# Patient Record
Sex: FEMALE | Race: WHITE | NOT HISPANIC OR LATINO | Employment: FULL TIME
[De-identification: names, ages, dates, MRNs, and addresses within clinical notes are randomized per-mention and may not be internally consistent; named-entity substitution may affect disease eponyms.]

---

## 2017-06-15 ENCOUNTER — RECORDS - HEALTHEAST (OUTPATIENT)
Dept: ADMINISTRATIVE | Facility: OTHER | Age: 22
End: 2017-06-15

## 2018-09-18 ENCOUNTER — AMBULATORY - HEALTHEAST (OUTPATIENT)
Dept: FAMILY MEDICINE | Facility: CLINIC | Age: 23
End: 2018-09-18

## 2018-09-18 ENCOUNTER — COMMUNICATION - HEALTHEAST (OUTPATIENT)
Dept: TELEHEALTH | Facility: CLINIC | Age: 23
End: 2018-09-18

## 2018-09-18 ENCOUNTER — OFFICE VISIT - HEALTHEAST (OUTPATIENT)
Dept: FAMILY MEDICINE | Facility: CLINIC | Age: 23
End: 2018-09-18

## 2018-09-18 DIAGNOSIS — F41.1 GENERALIZED ANXIETY DISORDER: ICD-10-CM

## 2018-09-18 DIAGNOSIS — Z76.89 ESTABLISHING CARE WITH NEW DOCTOR, ENCOUNTER FOR: ICD-10-CM

## 2018-09-18 ASSESSMENT — MIFFLIN-ST. JEOR: SCORE: 1605.9

## 2018-09-19 ENCOUNTER — AMBULATORY - HEALTHEAST (OUTPATIENT)
Dept: FAMILY MEDICINE | Facility: CLINIC | Age: 23
End: 2018-09-19

## 2018-10-17 ENCOUNTER — OFFICE VISIT - HEALTHEAST (OUTPATIENT)
Dept: FAMILY MEDICINE | Facility: CLINIC | Age: 23
End: 2018-10-17

## 2018-10-17 DIAGNOSIS — Z00.00 ANNUAL PHYSICAL EXAM: ICD-10-CM

## 2018-10-17 DIAGNOSIS — F41.0 PANIC DISORDER WITHOUT AGORAPHOBIA: ICD-10-CM

## 2018-10-17 DIAGNOSIS — F41.1 GENERALIZED ANXIETY DISORDER: ICD-10-CM

## 2018-10-17 LAB
ANION GAP SERPL CALCULATED.3IONS-SCNC: 10 MMOL/L (ref 5–18)
BUN SERPL-MCNC: 10 MG/DL (ref 8–22)
CALCIUM SERPL-MCNC: 9.9 MG/DL (ref 8.5–10.5)
CHLORIDE BLD-SCNC: 103 MMOL/L (ref 98–107)
CHOLEST SERPL-MCNC: 224 MG/DL
CO2 SERPL-SCNC: 26 MMOL/L (ref 22–31)
CREAT SERPL-MCNC: 0.76 MG/DL (ref 0.6–1.1)
FASTING STATUS PATIENT QL REPORTED: NO
GFR SERPL CREATININE-BSD FRML MDRD: >60 ML/MIN/1.73M2
GLUCOSE BLD-MCNC: 94 MG/DL (ref 70–125)
HDLC SERPL-MCNC: 46 MG/DL
HGB BLD-MCNC: 12.8 G/DL (ref 12–16)
LDLC SERPL CALC-MCNC: 124 MG/DL
POTASSIUM BLD-SCNC: 4.1 MMOL/L (ref 3.5–5)
SODIUM SERPL-SCNC: 139 MMOL/L (ref 136–145)
TRIGL SERPL-MCNC: 271 MG/DL
TSH SERPL DL<=0.005 MIU/L-ACNC: 3.05 UIU/ML (ref 0.3–5)

## 2018-10-17 ASSESSMENT — MIFFLIN-ST. JEOR: SCORE: 1605.9

## 2018-10-18 LAB
25(OH)D3 SERPL-MCNC: 17.2 NG/ML (ref 30–80)
25(OH)D3 SERPL-MCNC: 17.2 NG/ML (ref 30–80)

## 2018-10-19 ENCOUNTER — AMBULATORY - HEALTHEAST (OUTPATIENT)
Dept: FAMILY MEDICINE | Facility: CLINIC | Age: 23
End: 2018-10-19

## 2018-10-19 DIAGNOSIS — E55.9 VITAMIN D DEFICIENCY: ICD-10-CM

## 2018-11-20 ENCOUNTER — COMMUNICATION - HEALTHEAST (OUTPATIENT)
Dept: FAMILY MEDICINE | Facility: CLINIC | Age: 23
End: 2018-11-20

## 2018-11-20 DIAGNOSIS — F41.1 GENERALIZED ANXIETY DISORDER: ICD-10-CM

## 2019-01-17 ENCOUNTER — COMMUNICATION - HEALTHEAST (OUTPATIENT)
Dept: FAMILY MEDICINE | Facility: CLINIC | Age: 24
End: 2019-01-17

## 2019-01-17 ENCOUNTER — OFFICE VISIT - HEALTHEAST (OUTPATIENT)
Dept: FAMILY MEDICINE | Facility: CLINIC | Age: 24
End: 2019-01-17

## 2019-01-17 DIAGNOSIS — F41.1 GENERALIZED ANXIETY DISORDER: ICD-10-CM

## 2019-01-17 DIAGNOSIS — F41.0 PANIC DISORDER WITHOUT AGORAPHOBIA: ICD-10-CM

## 2019-01-17 ASSESSMENT — MIFFLIN-ST. JEOR: SCORE: 1634.54

## 2019-04-25 ENCOUNTER — OFFICE VISIT - HEALTHEAST (OUTPATIENT)
Dept: FAMILY MEDICINE | Facility: CLINIC | Age: 24
End: 2019-04-25

## 2019-04-25 DIAGNOSIS — F41.1 GENERALIZED ANXIETY DISORDER: ICD-10-CM

## 2019-04-25 DIAGNOSIS — F40.01 PANIC DISORDER WITH AGORAPHOBIA: ICD-10-CM

## 2019-04-25 ASSESSMENT — MIFFLIN-ST. JEOR: SCORE: 1671.39

## 2019-05-21 ENCOUNTER — OFFICE VISIT - HEALTHEAST (OUTPATIENT)
Dept: FAMILY MEDICINE | Facility: CLINIC | Age: 24
End: 2019-05-21

## 2019-05-21 DIAGNOSIS — F41.0 PANIC DISORDER WITHOUT AGORAPHOBIA: ICD-10-CM

## 2019-05-21 DIAGNOSIS — F41.1 GENERALIZED ANXIETY DISORDER: ICD-10-CM

## 2019-05-21 DIAGNOSIS — M25.572 ACUTE LEFT ANKLE PAIN: ICD-10-CM

## 2019-05-21 ASSESSMENT — MIFFLIN-ST. JEOR: SCORE: 1643.61

## 2019-06-18 ENCOUNTER — OFFICE VISIT - HEALTHEAST (OUTPATIENT)
Dept: FAMILY MEDICINE | Facility: CLINIC | Age: 24
End: 2019-06-18

## 2019-06-18 DIAGNOSIS — F40.01 PANIC DISORDER WITH AGORAPHOBIA: ICD-10-CM

## 2019-06-18 DIAGNOSIS — F41.1 GENERALIZED ANXIETY DISORDER: ICD-10-CM

## 2019-06-18 DIAGNOSIS — R21 RASH: ICD-10-CM

## 2019-06-18 ASSESSMENT — MIFFLIN-ST. JEOR: SCORE: 1642.19

## 2019-06-20 ENCOUNTER — AMBULATORY - HEALTHEAST (OUTPATIENT)
Dept: FAMILY MEDICINE | Facility: CLINIC | Age: 24
End: 2019-06-20

## 2019-07-16 ENCOUNTER — OFFICE VISIT - HEALTHEAST (OUTPATIENT)
Dept: FAMILY MEDICINE | Facility: CLINIC | Age: 24
End: 2019-07-16

## 2019-07-16 DIAGNOSIS — F40.01 PANIC DISORDER WITH AGORAPHOBIA: ICD-10-CM

## 2019-07-16 DIAGNOSIS — F41.1 GENERALIZED ANXIETY DISORDER: ICD-10-CM

## 2019-07-16 ASSESSMENT — MIFFLIN-ST. JEOR: SCORE: 1637.66

## 2020-01-21 ENCOUNTER — OFFICE VISIT - HEALTHEAST (OUTPATIENT)
Dept: FAMILY MEDICINE | Facility: CLINIC | Age: 25
End: 2020-01-21

## 2020-01-21 DIAGNOSIS — F41.1 GENERALIZED ANXIETY DISORDER: ICD-10-CM

## 2020-01-21 DIAGNOSIS — F40.01 PANIC DISORDER WITH AGORAPHOBIA: ICD-10-CM

## 2020-01-21 ASSESSMENT — ANXIETY QUESTIONNAIRES
5. BEING SO RESTLESS THAT IT IS HARD TO SIT STILL: NOT AT ALL
3. WORRYING TOO MUCH ABOUT DIFFERENT THINGS: NOT AT ALL
4. TROUBLE RELAXING: NOT AT ALL
2. NOT BEING ABLE TO STOP OR CONTROL WORRYING: NOT AT ALL
IF YOU CHECKED OFF ANY PROBLEMS ON THIS QUESTIONNAIRE, HOW DIFFICULT HAVE THESE PROBLEMS MADE IT FOR YOU TO DO YOUR WORK, TAKE CARE OF THINGS AT HOME, OR GET ALONG WITH OTHER PEOPLE: SOMEWHAT DIFFICULT
6. BECOMING EASILY ANNOYED OR IRRITABLE: NOT AT ALL
1. FEELING NERVOUS, ANXIOUS, OR ON EDGE: SEVERAL DAYS
7. FEELING AFRAID AS IF SOMETHING AWFUL MIGHT HAPPEN: NOT AT ALL
GAD7 TOTAL SCORE: 1

## 2020-01-21 ASSESSMENT — MIFFLIN-ST. JEOR: SCORE: 1752.76

## 2020-01-21 ASSESSMENT — PATIENT HEALTH QUESTIONNAIRE - PHQ9: SUM OF ALL RESPONSES TO PHQ QUESTIONS 1-9: 1

## 2020-01-28 ENCOUNTER — OFFICE VISIT - HEALTHEAST (OUTPATIENT)
Dept: FAMILY MEDICINE | Facility: CLINIC | Age: 25
End: 2020-01-28

## 2020-01-28 DIAGNOSIS — Z00.00 ANNUAL PHYSICAL EXAM: ICD-10-CM

## 2020-01-28 DIAGNOSIS — Z13.79 GENETIC TESTING OF FEMALE: ICD-10-CM

## 2020-01-28 DIAGNOSIS — Z80.3 FAMILY HISTORY OF MALIGNANT NEOPLASM OF BREAST: ICD-10-CM

## 2020-01-28 LAB
ANION GAP SERPL CALCULATED.3IONS-SCNC: 14 MMOL/L (ref 5–18)
BUN SERPL-MCNC: 9 MG/DL (ref 8–22)
CALCIUM SERPL-MCNC: 9.5 MG/DL (ref 8.5–10.5)
CHLORIDE BLD-SCNC: 103 MMOL/L (ref 98–107)
CHOLEST SERPL-MCNC: 243 MG/DL
CLUE CELLS: NORMAL
CO2 SERPL-SCNC: 22 MMOL/L (ref 22–31)
CREAT SERPL-MCNC: 0.75 MG/DL (ref 0.6–1.1)
FASTING STATUS PATIENT QL REPORTED: YES
GFR SERPL CREATININE-BSD FRML MDRD: >60 ML/MIN/1.73M2
GLUCOSE BLD-MCNC: 101 MG/DL (ref 70–125)
HDLC SERPL-MCNC: 48 MG/DL
HGB BLD-MCNC: 13.6 G/DL (ref 12–16)
HIV 1+2 AB+HIV1 P24 AG SERPL QL IA: NEGATIVE
LDLC SERPL CALC-MCNC: 145 MG/DL
POTASSIUM BLD-SCNC: 4.3 MMOL/L (ref 3.5–5)
SODIUM SERPL-SCNC: 139 MMOL/L (ref 136–145)
TRICHOMONAS, WET PREP: NORMAL
TRIGL SERPL-MCNC: 250 MG/DL
TSH SERPL DL<=0.005 MIU/L-ACNC: 3.01 UIU/ML (ref 0.3–5)
YEAST, WET PREP: NORMAL

## 2020-01-28 ASSESSMENT — MIFFLIN-ST. JEOR: SCORE: 1758.43

## 2020-01-29 LAB
25(OH)D3 SERPL-MCNC: 30.7 NG/ML (ref 30–80)
25(OH)D3 SERPL-MCNC: 30.7 NG/ML (ref 30–80)
BKR LAB AP ABNORMAL BLEEDING: NO
BKR LAB AP BIRTH CONTROL/HORMONES: NORMAL
BKR LAB AP CERVICAL APPEARANCE: NORMAL
BKR LAB AP GYN ADEQUACY: NORMAL
BKR LAB AP GYN INTERPRETATION: NORMAL
BKR LAB AP HPV REFLEX: NORMAL
BKR LAB AP LMP: NORMAL
BKR LAB AP PATIENT STATUS: NORMAL
BKR LAB AP PREVIOUS ABNORMAL: NO
BKR LAB AP PREVIOUS NORMAL: NORMAL
HIGH RISK?: NO
PATH REPORT.COMMENTS IMP SPEC: NORMAL
RESULT FLAG (HE HISTORICAL CONVERSION): NORMAL

## 2020-02-27 ENCOUNTER — COMMUNICATION - HEALTHEAST (OUTPATIENT)
Dept: ADMINISTRATIVE | Facility: HOSPITAL | Age: 25
End: 2020-02-27

## 2020-02-28 ENCOUNTER — OFFICE VISIT - HEALTHEAST (OUTPATIENT)
Dept: ONCOLOGY | Facility: CLINIC | Age: 25
End: 2020-02-28

## 2020-02-28 DIAGNOSIS — Z80.3 FAMILY HISTORY OF MALIGNANT NEOPLASM OF BREAST: ICD-10-CM

## 2020-02-28 DIAGNOSIS — Z71.83 ENCOUNTER FOR NONPROCREATIVE GENETIC COUNSELING: ICD-10-CM

## 2020-08-05 ENCOUNTER — OFFICE VISIT - HEALTHEAST (OUTPATIENT)
Dept: FAMILY MEDICINE | Facility: CLINIC | Age: 25
End: 2020-08-05

## 2020-08-05 DIAGNOSIS — L30.9 ECZEMA, UNSPECIFIED TYPE: ICD-10-CM

## 2020-08-05 DIAGNOSIS — F40.01 PANIC DISORDER WITH AGORAPHOBIA: ICD-10-CM

## 2020-08-05 DIAGNOSIS — H91.93 DECREASED HEARING OF BOTH EARS: ICD-10-CM

## 2020-08-05 DIAGNOSIS — F41.1 GENERALIZED ANXIETY DISORDER: ICD-10-CM

## 2020-08-05 RX ORDER — FLUOCINONIDE TOPICAL SOLUTION USP, 0.05% 0.5 MG/ML
SOLUTION TOPICAL 2 TIMES DAILY
Qty: 60 ML | Refills: 0 | Status: SHIPPED | OUTPATIENT
Start: 2020-08-05

## 2020-08-05 ASSESSMENT — ANXIETY QUESTIONNAIRES
IF YOU CHECKED OFF ANY PROBLEMS ON THIS QUESTIONNAIRE, HOW DIFFICULT HAVE THESE PROBLEMS MADE IT FOR YOU TO DO YOUR WORK, TAKE CARE OF THINGS AT HOME, OR GET ALONG WITH OTHER PEOPLE: SOMEWHAT DIFFICULT
5. BEING SO RESTLESS THAT IT IS HARD TO SIT STILL: NOT AT ALL
2. NOT BEING ABLE TO STOP OR CONTROL WORRYING: NOT AT ALL
7. FEELING AFRAID AS IF SOMETHING AWFUL MIGHT HAPPEN: NOT AT ALL
3. WORRYING TOO MUCH ABOUT DIFFERENT THINGS: SEVERAL DAYS
1. FEELING NERVOUS, ANXIOUS, OR ON EDGE: MORE THAN HALF THE DAYS
GAD7 TOTAL SCORE: 4
4. TROUBLE RELAXING: NOT AT ALL
6. BECOMING EASILY ANNOYED OR IRRITABLE: SEVERAL DAYS

## 2020-08-05 ASSESSMENT — MIFFLIN-ST. JEOR: SCORE: 1764.66

## 2020-08-05 ASSESSMENT — PATIENT HEALTH QUESTIONNAIRE - PHQ9: SUM OF ALL RESPONSES TO PHQ QUESTIONS 1-9: 7

## 2021-03-12 ENCOUNTER — OFFICE VISIT - HEALTHEAST (OUTPATIENT)
Dept: FAMILY MEDICINE | Facility: CLINIC | Age: 26
End: 2021-03-12

## 2021-03-12 DIAGNOSIS — F41.1 GENERALIZED ANXIETY DISORDER: ICD-10-CM

## 2021-03-12 DIAGNOSIS — F40.01 PANIC DISORDER WITH AGORAPHOBIA: ICD-10-CM

## 2021-03-12 DIAGNOSIS — F33.0 MILD EPISODE OF RECURRENT MAJOR DEPRESSIVE DISORDER (H): ICD-10-CM

## 2021-03-12 RX ORDER — PAROXETINE 40 MG/1
40 TABLET, FILM COATED ORAL DAILY
Qty: 90 TABLET | Refills: 1 | Status: SHIPPED | OUTPATIENT
Start: 2021-03-12 | End: 2021-07-27

## 2021-03-12 ASSESSMENT — ANXIETY QUESTIONNAIRES
4. TROUBLE RELAXING: NOT AT ALL
IF YOU CHECKED OFF ANY PROBLEMS ON THIS QUESTIONNAIRE, HOW DIFFICULT HAVE THESE PROBLEMS MADE IT FOR YOU TO DO YOUR WORK, TAKE CARE OF THINGS AT HOME, OR GET ALONG WITH OTHER PEOPLE: SOMEWHAT DIFFICULT
7. FEELING AFRAID AS IF SOMETHING AWFUL MIGHT HAPPEN: NOT AT ALL
GAD7 TOTAL SCORE: 2
5. BEING SO RESTLESS THAT IT IS HARD TO SIT STILL: NOT AT ALL
6. BECOMING EASILY ANNOYED OR IRRITABLE: NOT AT ALL
3. WORRYING TOO MUCH ABOUT DIFFERENT THINGS: NOT AT ALL
2. NOT BEING ABLE TO STOP OR CONTROL WORRYING: NOT AT ALL
1. FEELING NERVOUS, ANXIOUS, OR ON EDGE: MORE THAN HALF THE DAYS

## 2021-03-12 ASSESSMENT — PATIENT HEALTH QUESTIONNAIRE - PHQ9: SUM OF ALL RESPONSES TO PHQ QUESTIONS 1-9: 2

## 2021-03-22 ENCOUNTER — COMMUNICATION - HEALTHEAST (OUTPATIENT)
Dept: FAMILY MEDICINE | Facility: CLINIC | Age: 26
End: 2021-03-22

## 2021-05-27 ENCOUNTER — RECORDS - HEALTHEAST (OUTPATIENT)
Dept: ADMINISTRATIVE | Facility: CLINIC | Age: 26
End: 2021-05-27

## 2021-05-27 ASSESSMENT — PATIENT HEALTH QUESTIONNAIRE - PHQ9
SUM OF ALL RESPONSES TO PHQ QUESTIONS 1-9: 2
SUM OF ALL RESPONSES TO PHQ QUESTIONS 1-9: 1
SUM OF ALL RESPONSES TO PHQ QUESTIONS 1-9: 7

## 2021-05-28 ASSESSMENT — ANXIETY QUESTIONNAIRES
GAD7 TOTAL SCORE: 4
GAD7 TOTAL SCORE: 1
GAD7 TOTAL SCORE: 2

## 2021-05-28 NOTE — PROGRESS NOTES
1. Generalized anxiety disorder  PARoxetine (PAXIL) 20 MG tablet    DISCONTINUED: sertraline (ZOLOFT) 100 MG tablet   2. Panic disorder with agoraphobia  PARoxetine (PAXIL) 20 MG tablet     PHQ-9: 1  CHELA-7: 7    ASSESSMENT/PLAN:     The following are part of a depression follow up plan for the patient:  coping support assessment and emotional support assessment    1. Generalized anxiety disorder    - PARoxetine (PAXIL) 20 MG tablet; Take 1 tablet (20 mg total) by mouth daily.  Dispense: 30 tablet; Refill: 0    2. Panic disorder with agoraphobia    - PARoxetine (PAXIL) 20 MG tablet; Take 1 tablet (20 mg total) by mouth daily.  Dispense: 30 tablet; Refill: 0      There are no Patient Instructions on file for this visit.  Medications Discontinued During This Encounter   Medication Reason     sertraline (ZOLOFT) 50 MG tablet Dose adjustment     sertraline (ZOLOFT) 100 MG tablet Side effects     Return in about 4 weeks (around 5/21/2019) for depression, anxiety.         Marbella Rodriguez NP          SUBJECTIVE:  Darby Lopez is a 23 y.o. female presents for anxiety with acute panic state and Agoraphobia and depression.     Patient was last seen in clinic 3 months ago and at that time, her anxiety, Agoraphobia and depression were well controlled.  She was given Zoloft 50 mg and things are going well.  She states that over the last several weeks since the weather has been nicer, this has presented her more opportunities to go out and do other activities such as long boarding with her brother.  She states the other day, her and her brother were long boarding and they went deeper into the [place] that she normally has in the past and she had to stop.  She was fearful for how far away she was from her vehicle and had to turn back.  She has been staying up later at night as well, she continues to get 6 to 8 hours asleep at night.  She has also been experiencing abdominal bloating with diarrhea symptoms for the last 2  "months.  She states the stools can be anywhere from watery to soft like oatmeal.  She denies any black or bloody stools.  She has been managed in the past by psychiatry for her anxiety, depression and [place] phobia.  Previous medications include fluoxetine, escitalopram and citalopram.  He has no thoughts of self-harm or plans to suicide, no reports of self-mutilation.  Coping strategies include going to place where the lights are low and she can talk herself down, listening to music and pushing through the rest of her day.  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  Suspect that her abdominal bloating with diarrhea is related to the sertraline use, we did discuss other options of medication and she has opted to try the Paxil.  Goals for her anxiety, depression and [place] phobia \"I would like to be able to go out and enjoy more things, especially the summer\".  If I am not able to get her symptoms controlled on this medication, referral to psych will be the next recommendation due to her Agoraphobia, anxiety and depressive state. VSS.  Chief Complaint   Patient presents with     Follow-up     Med Discussion -          Patient Active Problem List   Diagnosis     Generalized anxiety disorder     Panic disorder without agoraphobia     Panic disorder with agoraphobia       Current Outpatient Medications   Medication Sig Dispense Refill     PARoxetine (PAXIL) 20 MG tablet Take 1 tablet (20 mg total) by mouth daily. 30 tablet 0     No current facility-administered medications for this visit.        Social History     Tobacco Use   Smoking Status Never Smoker   Smokeless Tobacco Never Used       REVIEW OF SYSTEMS: Excessive worries, restlessness, on edge, easily fatigues, irritability, disturbed sleep, decreased concentration, muscle tension, fear of health.      TOBACCO USE:  Social History     Tobacco Use   Smoking Status Never Smoker   Smokeless Tobacco Never Used     Social " History     Socioeconomic History     Marital status: Single     Spouse name: Not on file     Number of children: 0     Years of education: 14     Highest education level: Not on file   Occupational History     Occupation: veterinary tech     Employer: [employer]   Social Needs     Financial resource strain: Not on file     Food insecurity:     Worry: Not on file     Inability: Not on file     Transportation needs:     Medical: Not on file     Non-medical: Not on file   Tobacco Use     Smoking status: Never Smoker     Smokeless tobacco: Never Used   Substance and Sexual Activity     Alcohol use: No     Drug use: No     Sexual activity: Never   Lifestyle     Physical activity:     Days per week: Not on file     Minutes per session: Not on file     Stress: Not on file   Relationships     Social connections:     Talks on phone: Not on file     Gets together: Not on file     Attends Orthodox service: Not on file     Active member of club or organization: Not on file     Attends meetings of clubs or organizations: Not on file     Relationship status: Not on file     Intimate partner violence:     Fear of current or ex partner: Not on file     Emotionally abused: Not on file     Physically abused: Not on file     Forced sexual activity: Not on file   Other Topics Concern     Not on file   Social History Narrative    Lives with parents, was home schooled all her life up until age 19         OBJECTIVE:            Vitals:    04/25/19 1437   BP: 110/76   Pulse: 85   Resp: 14   Temp: 98.5  F (36.9  C)   SpO2: 99%     Weight: 199 lb 7 oz (90.5 kg)    Wt Readings from Last 3 Encounters:   04/25/19 199 lb 7 oz (90.5 kg)   01/17/19 191 lb 5 oz (86.8 kg)   10/17/18 185 lb (83.9 kg)     Body mass index is 32.19 kg/m .        Physical Exam:  GENERAL APPEARANCE: A&A, NAD, well hydrated, well nourished  CV: RRR, no M/G/R   LUNGS: CTAB, normal respiratory effort  ABDOMEN: S&NT, no masses, no organomegaly, BS present x4   SKIN:   Normal skin turgor, no lesions/rashes, warm and dry   PSYCHIATRIC;  Mood appropriate, memory intact, good eye contact, engaged in conversation

## 2021-05-29 NOTE — PROGRESS NOTES
1. Generalized anxiety disorder  PARoxetine (PAXIL) 10 MG tablet   2. Panic disorder without agoraphobia  PARoxetine (PAXIL) 10 MG tablet   3. Acute left ankle pain       PHQ-9: 1  CHELA-7: 10    ASSESSMENT/PLAN:     The following are part of a depression follow up plan for the patient:  coping support assessment and emotional support assessment  The following high BMI interventions were performed this visit: exercise promotion: stretching    1. Generalized anxiety disorder    - PARoxetine (PAXIL) 10 MG tablet; Take 3 tablets (30 mg total) by mouth daily.  Dispense: 90 tablet; Refill: 0    2. Panic disorder without agoraphobia    - PARoxetine (PAXIL) 10 MG tablet; Take 3 tablets (30 mg total) by mouth daily.  Dispense: 90 tablet; Refill: 0    3. Acute left ankle pain    -Patient instructed to rest, ice the affected area several times per day for 10 minutes at a time, may use compression wrap to the area if pain and swelling occur and elevated the affected area whenever patient is able. May take Tylenol 1000 mg four times per day or Ibuprofen 800 mg three times daily for pain and inflammation.         There are no Patient Instructions on file for this visit.  Medications Discontinued During This Encounter   Medication Reason     medroxyPROGESTERone injection 150 mg (DEPO-PROVERA)      PARoxetine (PAXIL) 20 MG tablet Dose adjustment     Return in about 1 month (around 6/18/2019) for depression, anxiety.        Marbella Rodriguez NP          SUBJECTIVE:  Darby Lopez is a 23 y.o. female who presents for depression follow-up today.  Patient was previously taking Zoloft was experiencing GI side effects from the medication that included bloating and diarrhea.  We did take her off the Zoloft and initiated her on Paxil 20 mg daily.  Current stressors include the recent diagnosis of her dog's cancer in her left ankle pain.  She continues to have episodes of fever and anxiety and some heart palpitations, she has had less  Form faxed with confirmation of receipt. Original placed up front.   intrusive thoughts of acute panic states when she knows she is going out in public with large crowds.  She continues to use music and distraction as a coping mechanism for her anxiety and acute panic state.  She has not tried long boarding again since she went with her brother the last time I saw her in clinic.  Last time she went long boarding, she was fearful about how far away she was getting from her car and immediately turned back on the Trail she was on with her brother.  She would like to go up a little bit on her Paxil medication, she feels that a dose increase would be most appropriate for her at this time being that she still has some fear and anxiety with heart palpitations when she is around large crowds.    Left ankle pain with swelling: Onset: 3 weeks.  She states the swelling has been constant in the posterior tibial region.  Aggravating factors include walking and towards the end of her workday she will notice increased swelling.  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  Relieving factors include elevating the extremity and laying flat.  She has not noticed any color changes or numbness or tingling, she denies any limited range of motion.  No history of blood clot formation.  Left ankle is measuring half an inch larger than the other ankle.  We did discuss limiting her sodium intake and increasing her water intake to see if we can get the swelling to go down a bit.  No x-ray imaging or ultrasound is necessary at this time being that she has no color changes or discomfort, no limited range of motion.  Courage her to ice the area and use a compression brace to see if this assists with the swelling as well.  Chief Complaint   Patient presents with     Follow-up     Med Check-      Ankle Pain     LT swollen x 3 weeks no known injurym no pain         Patient Active Problem List   Diagnosis     Generalized anxiety disorder     Panic disorder without agoraphobia      Panic disorder with agoraphobia       Current Outpatient Medications   Medication Sig Dispense Refill     PARoxetine (PAXIL) 10 MG tablet Take 3 tablets (30 mg total) by mouth daily. 90 tablet 0     No current facility-administered medications for this visit.        Social History     Tobacco Use   Smoking Status Never Smoker   Smokeless Tobacco Never Used       REVIEW OF SYSTEMS: Excessive worries, restlessness, on edge, easily fatigues, irritability, disturbed sleep, decreased concentration, muscle tension, fear of health.      TOBACCO USE:  Social History     Tobacco Use   Smoking Status Never Smoker   Smokeless Tobacco Never Used     Social History     Socioeconomic History     Marital status: Single     Spouse name: Not on file     Number of children: 0     Years of education: 14     Highest education level: Not on file   Occupational History     Occupation: veterinary tech     Employer: [employer]   Social Needs     Financial resource strain: Not on file     Food insecurity:     Worry: Not on file     Inability: Not on file     Transportation needs:     Medical: Not on file     Non-medical: Not on file   Tobacco Use     Smoking status: Never Smoker     Smokeless tobacco: Never Used   Substance and Sexual Activity     Alcohol use: No     Drug use: No     Sexual activity: Never   Lifestyle     Physical activity:     Days per week: Not on file     Minutes per session: Not on file     Stress: Not on file   Relationships     Social connections:     Talks on phone: Not on file     Gets together: Not on file     Attends Church service: Not on file     Active member of club or organization: Not on file     Attends meetings of clubs or organizations: Not on file     Relationship status: Not on file     Intimate partner violence:     Fear of current or ex partner: Not on file     Emotionally abused: Not on file     Physically abused: Not on file     Forced sexual activity: Not on file   Other Topics Concern      Not on file   Social History Narrative    Lives with parents, was home schooled all her life up until age 19         OBJECTIVE:            Vitals:    05/21/19 1535   BP: 110/70   Pulse: 88   Resp: 12   Temp: 98.8  F (37.1  C)   SpO2: 98%     Weight: 193 lb 5 oz (87.7 kg)    Wt Readings from Last 3 Encounters:   05/21/19 193 lb 5 oz (87.7 kg)   04/25/19 199 lb 7 oz (90.5 kg)   01/17/19 191 lb 5 oz (86.8 kg)     Body mass index is 31.2 kg/m .        Physical Exam:  GENERAL APPEARANCE: A&A, NAD, well hydrated, well nourished  CV: RRR, no M/G/R   LUNGS: CTAB, normal respiratory effort  ABDOMEN: S&NT, no masses, no organomegaly, BS present x4   EXTREMITY: Mild edema in the left posttibial region with 2+ pitting edema.  Ankle measures 8 and half inches, right ankle measures 8 inches.  Patient has full range of motion.  No discoloration, she does have full sensation.   SKIN:  Normal skin turgor, no lesions/rashes, warm and dry    PSYCHIATRIC;  Mood appropriate, memory intact, good eye contact, engaged in conversation

## 2021-05-29 NOTE — PROGRESS NOTES
1. Generalized anxiety disorder  PARoxetine (PAXIL) 40 MG tablet   2. Panic disorder with agoraphobia  PARoxetine (PAXIL) 40 MG tablet   3. Rash  fluocinolone (VANOS) 0.01 % cream     PHQ-9: 0  CHELA-7: 5    ASSESSMENT/PLAN:     The following are part of a depression follow up plan for the patient:  coping support assessment and emotional support assessment    1. Generalized anxiety disorder    - PARoxetine (PAXIL) 40 MG tablet; Take 1 tablet (40 mg total) by mouth daily.  Dispense: 30 tablet; Refill: 0    2. Panic disorder with agoraphobia    - PARoxetine (PAXIL) 40 MG tablet; Take 1 tablet (40 mg total) by mouth daily.  Dispense: 30 tablet; Refill: 0    3. Rash    -previously prescribed this by her dermatologist for her eczema  - fluocinolone (VANOS) 0.01 % cream; Apply topically 2 (two) times a day for 14 days.  Dispense: 60 g; Refill: 0      There are no Patient Instructions on file for this visit.  Medications Discontinued During This Encounter   Medication Reason     PARoxetine (PAXIL) 10 MG tablet Dose adjustment     Return in about 1 month (around 7/16/2019) for anxiety. Paxil dose increased to 40 mg daily          Marbella Rodriguez NP          SUBJECTIVE:  Darby Lopez is a 24 y.o. female present for anxiety, depression and agoraphobia follow up    Symptoms well controlled on current dose of medication, however, she would like to discuss an increase in her dosing due to ongoing issues with her agoraphobia. She has been out to eat with her parents to [employer], with friends to a bar/restauraunt for her recent birthday and with her grandma to [place] since she has been on the 30 mg dose. She continues to experience feelings like she is going to have a panic attack, and she has continued to leave social functions after feeling these episodes a few times. She was able to venture a little bit further from her house when she was looking for her cat one night after he got out of the house. We  did review the current guideline for treating anxiety with agoraphobia and studies do suggest doing up to 40 mg daily. Coping strategies: has not had to use them much but include chewing gum, lavender oil and listening to music with her headphones in. She admits to mild fatigue and a few episodes of constipation. Denies thoughts of self harm, suicide or self mutilation.     She is requesting a refill of her topical cream that she uses for her eczema. Last prescribed by her dermatologist. Rash: located on left wrist and posterior neck. Dry, scaly and itchy. No pain. Thinks it may have flared up from detergent her mom is using. No recent travel or any other new products.   Chief Complaint   Patient presents with     Follow-up     Med Check -          Patient Active Problem List   Diagnosis     Generalized anxiety disorder     Panic disorder without agoraphobia     Panic disorder with agoraphobia       Current Outpatient Medications   Medication Sig Dispense Refill     fluocinolone (VANOS) 0.01 % cream Apply topically 2 (two) times a day for 14 days. 60 g 0     PARoxetine (PAXIL) 40 MG tablet Take 1 tablet (40 mg total) by mouth daily. 30 tablet 0     No current facility-administered medications for this visit.        Social History     Tobacco Use   Smoking Status Never Smoker   Smokeless Tobacco Never Used       REVIEW OF SYSTEMS: Denies new soaps, lotions, makeup, foods, bedding, recent travel, sick contacts, fever or chills.      TOBACCO USE:  Social History     Tobacco Use   Smoking Status Never Smoker   Smokeless Tobacco Never Used     Social History     Socioeconomic History     Marital status: Single     Spouse name: Not on file     Number of children: 0     Years of education: 14     Highest education level: Not on file   Occupational History     Occupation: veterinary tech     Employer: [employer]   Social Needs     Financial resource strain: Not on file     Food insecurity:     Worry: Not on file      Inability: Not on file     Transportation needs:     Medical: Not on file     Non-medical: Not on file   Tobacco Use     Smoking status: Never Smoker     Smokeless tobacco: Never Used   Substance and Sexual Activity     Alcohol use: No     Drug use: No     Sexual activity: Never   Lifestyle     Physical activity:     Days per week: Not on file     Minutes per session: Not on file     Stress: Not on file   Relationships     Social connections:     Talks on phone: Not on file     Gets together: Not on file     Attends Evangelical service: Not on file     Active member of club or organization: Not on file     Attends meetings of clubs or organizations: Not on file     Relationship status: Not on file     Intimate partner violence:     Fear of current or ex partner: Not on file     Emotionally abused: Not on file     Physically abused: Not on file     Forced sexual activity: Not on file   Other Topics Concern     Not on file   Social History Narrative    Lives with parents, was home schooled all her life up until age 19         OBJECTIVE:            Vitals:    06/18/19 1630   BP: 110/70   Pulse: 91   Resp: 14   Temp: 98.7  F (37.1  C)   SpO2: 99%     Weight: 193 lb (87.5 kg)    Wt Readings from Last 3 Encounters:   06/18/19 193 lb (87.5 kg)   05/21/19 193 lb 5 oz (87.7 kg)   04/25/19 199 lb 7 oz (90.5 kg)     Body mass index is 31.15 kg/m .        Physical Exam:  GENERAL APPEARANCE: A&A, NAD, well hydrated, well nourished  CV: RRR, no M/G/R   LUNGS: CTAB, normal respiratory effort  ABDOMEN: S&NT, no masses, no organomegaly, BS present x4   SKIN:  Normal skin turgor, small rash on left posterior wrist and back of neck, rash is dry and scaly, no bleeding noted. Slightly raised, warm and dry    PSYCHIATRIC;  Mood appropriate, memory intact, good eye contact, engaged in conversation

## 2021-05-30 NOTE — PROGRESS NOTES
1. Generalized anxiety disorder  PARoxetine (PAXIL) 40 MG tablet   2. Panic disorder with agoraphobia  PARoxetine (PAXIL) 40 MG tablet         ASSESSMENT/PLAN:     The following high BMI interventions were performed this visit: weight monitoring    1. Generalized anxiety disorder    - PARoxetine (PAXIL) 40 MG tablet; Take 1 tablet (40 mg total) by mouth daily.  Dispense: 90 tablet; Refill: 1    2. Panic disorder with agoraphobia    - PARoxetine (PAXIL) 40 MG tablet; Take 1 tablet (40 mg total) by mouth daily.  Dispense: 90 tablet; Refill: 1      There are no Patient Instructions on file for this visit.  Medications Discontinued During This Encounter   Medication Reason     PARoxetine (PAXIL) 40 MG tablet Reorder     Return in about 6 months (around 1/16/2020) for depression, anxiety, annual physical.        Marbella Rodriguez NP          SUBJECTIVE:  Darby Lopez is a 24 y.o. female here for follow up on anxiety and depression and agoraphobia.  She is currently taking paroxetine 40 mg daily and her symptoms are very well controlled.  Her symptoms of agoraphobia have significantly decreased.  Her CHELA 7 score is 1 and her PHQ 9 score is zero.  She has been able to go out to eat without the immediate feeling of dread and panic.  She has also been to go out of the house at night to retreive her cats.  She was previously unable to do this.  Her birthday was last month and she was able to go with her friends a couple times with no issues.  She denies having any panic attacks since her last visit with me in June.  She has been able to be more spontaneous and do different things without over thinking things.  She is even planning to go to the state fair and a trip to [place] in the fall.  Patient states her constipation has improved and she is not experiencing any unpleasant side effects.  Current stressors include work being busy due to a co-worker being on maternity leave.  But she no longer dreads going to work every  day.  Coping strategies include playing on her phone, lavender oil, chewing gum, or listening to music.  She denies thoughts of self harm or suicide.  Today she appears happy, relaxed, and is smiling throughout the visit.    She was unable to  the cream for the rash on her left wrist due to cost.  She has been using over the counter cortisone cream.  It has improved.  I did advise her to try using some coconut oil on it at bedtime.  She can take claritan or zyrtec during the day and benadryl at night. Vital signs are stable today.            Chief Complaint   Patient presents with     Follow-up     Med Check -          Patient Active Problem List   Diagnosis     Generalized anxiety disorder     Panic disorder without agoraphobia     Panic disorder with agoraphobia       Current Outpatient Medications   Medication Sig Dispense Refill     PARoxetine (PAXIL) 40 MG tablet Take 1 tablet (40 mg total) by mouth daily. 90 tablet 1     No current facility-administered medications for this visit.        Social History     Tobacco Use   Smoking Status Never Smoker   Smokeless Tobacco Never Used       REVIEW OF SYSTEMS: Negative except noted in HPI.    TOBACCO USE:  Social History     Tobacco Use   Smoking Status Never Smoker   Smokeless Tobacco Never Used     Social History     Socioeconomic History     Marital status: Single     Spouse name: Not on file     Number of children: 0     Years of education: 14     Highest education level: Not on file   Occupational History     Occupation: veterinary tech     Employer: [employer]   Social Needs     Financial resource strain: Not on file     Food insecurity:     Worry: Not on file     Inability: Not on file     Transportation needs:     Medical: Not on file     Non-medical: Not on file   Tobacco Use     Smoking status: Never Smoker     Smokeless tobacco: Never Used   Substance and Sexual Activity     Alcohol use: No     Drug use: No     Sexual activity: Never    Lifestyle     Physical activity:     Days per week: Not on file     Minutes per session: Not on file     Stress: Not on file   Relationships     Social connections:     Talks on phone: Not on file     Gets together: Not on file     Attends Congregational service: Not on file     Active member of club or organization: Not on file     Attends meetings of clubs or organizations: Not on file     Relationship status: Not on file     Intimate partner violence:     Fear of current or ex partner: Not on file     Emotionally abused: Not on file     Physically abused: Not on file     Forced sexual activity: Not on file   Other Topics Concern     Not on file   Social History Narrative    Lives with parents, was home schooled all her life up until age 19         OBJECTIVE:            Vitals:    07/16/19 1647   BP: 108/66   Pulse: 88   Resp: 14   Temp: 98.6  F (37  C)   SpO2: 98%     Weight: 192 lb (87.1 kg)    Wt Readings from Last 3 Encounters:   07/16/19 192 lb (87.1 kg)   06/18/19 193 lb (87.5 kg)   05/21/19 193 lb 5 oz (87.7 kg)     Body mass index is 30.99 kg/m .        Physical Exam:  GENERAL APPEARANCE: A&A, NAD, well hydrated, well nourished  CV: RRR, no M/G/R   LUNGS: CTAB, normal respiratory effort  ABDOMEN: Soft, non-distended   NEURO: no gross deficits  PSYCHIATRIC;  Mood appropriate, memory intact, good eye contact, engaged in conversation

## 2021-06-01 VITALS — WEIGHT: 185 LBS | BODY MASS INDEX: 29.73 KG/M2 | HEIGHT: 66 IN

## 2021-06-01 VITALS — HEIGHT: 66 IN | BODY MASS INDEX: 29.73 KG/M2 | WEIGHT: 185 LBS

## 2021-06-02 VITALS — HEIGHT: 66 IN | BODY MASS INDEX: 30.75 KG/M2 | WEIGHT: 191.31 LBS

## 2021-06-02 VITALS — WEIGHT: 193.31 LBS | HEIGHT: 66 IN | BODY MASS INDEX: 31.07 KG/M2

## 2021-06-02 VITALS — WEIGHT: 199.44 LBS | HEIGHT: 66 IN | BODY MASS INDEX: 32.05 KG/M2

## 2021-06-03 VITALS — WEIGHT: 193 LBS | HEIGHT: 66 IN | BODY MASS INDEX: 31.02 KG/M2

## 2021-06-03 VITALS — BODY MASS INDEX: 30.86 KG/M2 | WEIGHT: 192 LBS | HEIGHT: 66 IN

## 2021-06-04 VITALS
SYSTOLIC BLOOD PRESSURE: 110 MMHG | DIASTOLIC BLOOD PRESSURE: 76 MMHG | HEIGHT: 67 IN | HEART RATE: 94 BPM | RESPIRATION RATE: 16 BRPM | BODY MASS INDEX: 33.76 KG/M2 | OXYGEN SATURATION: 96 % | WEIGHT: 215.13 LBS | TEMPERATURE: 98.2 F

## 2021-06-04 VITALS
HEART RATE: 97 BPM | DIASTOLIC BLOOD PRESSURE: 66 MMHG | WEIGHT: 217.38 LBS | RESPIRATION RATE: 14 BRPM | TEMPERATURE: 98.4 F | BODY MASS INDEX: 34.93 KG/M2 | HEIGHT: 66 IN | OXYGEN SATURATION: 98 % | SYSTOLIC BLOOD PRESSURE: 102 MMHG

## 2021-06-04 VITALS
HEIGHT: 66 IN | SYSTOLIC BLOOD PRESSURE: 114 MMHG | DIASTOLIC BLOOD PRESSURE: 72 MMHG | BODY MASS INDEX: 35.36 KG/M2 | WEIGHT: 220 LBS | HEART RATE: 91 BPM | OXYGEN SATURATION: 98 %

## 2021-06-05 NOTE — PROGRESS NOTES
1. Generalized anxiety disorder  PHQ9 Depression Screen    PARoxetine (PAXIL) 40 MG tablet   2. Panic disorder with agoraphobia  PHQ9 Depression Screen    PARoxetine (PAXIL) 40 MG tablet     PHQ-9: 1  CHELA-7: 1    ASSESSMENT/PLAN:     The following are part of a depression follow up plan for the patient:  coping support assessment and emotional support assessment  The following high BMI interventions were performed this visit: encouragement to exercise and weight monitoring    1. Generalized anxiety disorder    - PHQ9 Depression Screen  - PARoxetine (PAXIL) 40 MG tablet; Take 1 tablet (40 mg total) by mouth daily.  Dispense: 90 tablet; Refill: 1  -She will plan on continuing with her current coping strategies as she feels that she is managing life pretty well at this time  -If she feels that her anxiety or panic attacks get out of control, she will return to the clinic for follow-up  -She is not seeing a counselor or psychiatrist at this time  -Depression action plan updated today    2. Panic disorder with agoraphobia    - PHQ9 Depression Screen  - PARoxetine (PAXIL) 40 MG tablet; Take 1 tablet (40 mg total) by mouth daily.  Dispense: 90 tablet; Refill: 1  -She will plan on continuing with her current coping strategies as she feels that she is managing life pretty well at this time  -If she feels that her anxiety or panic attacks get out of control, she will return to the clinic for follow-up  -She is not seeing a counselor or psychiatrist at this time        There are no Patient Instructions on file for this visit.  Medications Discontinued During This Encounter   Medication Reason     PARoxetine (PAXIL) 40 MG tablet Reorder     Return in about 1 week (around 1/28/2020) for annual physical.          Marbella Rodriguez NP          SUBJECTIVE:  Darby Lopez is a 24 y.o. female who presents for anxiety and depression follow-up.  Symptoms are well controlled on her current dose of Paxil at 40 mg daily.  Current  stressors include her mom being diagnosed with breast cancer last .  Her mom did complete radiation therapy and had a lump ectomy.  Her maternal grand father recently  as well as her dog.  Her paternal grandmother is now suffering from dementia.  She has had more panic attacks recently because of all the events that have occurred in the last few months.  She did wake up with a panic attack last night and felt mildly short of breath.  She experienced a panic attack on her hike with her brother in December and she is feeling mildly anxious today.  She denies any thoughts of self-harm or plans for suicide.  She is not seeing a counselor or psychiatrist at this time.  Coping strategies include playing games on her phone, lavender oil, chewing gum, listening to music and playing with her new puppy named Og.  Chief Complaint   Patient presents with     Follow Up     Med Check         Patient Active Problem List   Diagnosis     Generalized anxiety disorder     Panic disorder with agoraphobia       Current Outpatient Medications   Medication Sig Dispense Refill     PARoxetine (PAXIL) 40 MG tablet Take 1 tablet (40 mg total) by mouth daily. 90 tablet 1     No current facility-administered medications for this visit.        Social History     Tobacco Use   Smoking Status Never Smoker   Smokeless Tobacco Never Used       REVIEW OF SYSTEMS: Denies excessive worries, on edge, easily fatigues, irritability, disturbed sleep, decreased concentration, muscle tension, fear of health.      TOBACCO USE:  Social History     Tobacco Use   Smoking Status Never Smoker   Smokeless Tobacco Never Used     Social History     Socioeconomic History     Marital status: Single     Spouse name: Not on file     Number of children: 0     Years of education: 14     Highest education level: Not on file   Occupational History     Occupation: veterinary tech     Employer: [employer]   Social Needs     Financial resource strain: Not on  file     Food insecurity:     Worry: Not on file     Inability: Not on file     Transportation needs:     Medical: Not on file     Non-medical: Not on file   Tobacco Use     Smoking status: Never Smoker     Smokeless tobacco: Never Used   Substance and Sexual Activity     Alcohol use: No     Drug use: No     Sexual activity: Never   Lifestyle     Physical activity:     Days per week: Not on file     Minutes per session: Not on file     Stress: Not on file   Relationships     Social connections:     Talks on phone: Not on file     Gets together: Not on file     Attends Tenriism service: Not on file     Active member of club or organization: Not on file     Attends meetings of clubs or organizations: Not on file     Relationship status: Not on file     Intimate partner violence:     Fear of current or ex partner: Not on file     Emotionally abused: Not on file     Physically abused: Not on file     Forced sexual activity: Not on file   Other Topics Concern     Not on file   Social History Narrative    Lives with parents, was home schooled all her life up until age 19         OBJECTIVE:            Vitals:    01/21/20 0929   BP: 102/66   Pulse: 97   Resp: 14   Temp: 98.4  F (36.9  C)   SpO2: 98%     Weight: 217 lb 6 oz (98.6 kg)    Wt Readings from Last 3 Encounters:   01/21/20 217 lb 6 oz (98.6 kg)   07/16/19 192 lb (87.1 kg)   06/18/19 193 lb (87.5 kg)     Body mass index is 35.09 kg/m .        Physical Exam:  GENERAL APPEARANCE: A&A, NAD, well hydrated, well nourished  NECK: Supple, without lymphadenopathy, no thyroid mass, no JVD, no bruit  CV: RRR, no M/G/R   LUNGS: CTAB, normal respiratory effort   SKIN:  Normal skin turgor, no lesions/rashes, warm and dry    PSYCHIATRIC;  Mood appropriate, memory intact, good eye contact, engaged in conversation

## 2021-06-05 NOTE — PROGRESS NOTES
Darby Lopez is a 24 y.o. female is here for a  Health Maintenance exam. Medical, family and surgical history reviewed.  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  She does not drink or smoke and denies illicit drug use.  She continues to live with her parents, she does not exercise regularly.  She has never been sexually active.  She does menstruate regularly every 30 days, menstrual cycles last 5-7 days and are moderate in flow. She does not use contraception. She is up to date with vaccinations.     Her mother is currently being treated for breast cancer with chemotherapy and radiation.  Her mother did test negative for the BRCA gene.  Additional family history includes reproductive cancer in her great grandmother on the maternal side.  Patient is unsure if it was ovarian, cervical or uterine.    1. Annual physical exam  Thyroid Stimulating Hormone (TSH)    Basic Metabolic Panel    Hemoglobin    Vitamin D, Total (25-Hydroxy)    Lipid Cascade    HIV Antigen/Antibody Screening [place]    Gynecologic Cytology (PAP Smear)    Wet Prep, Vaginal   2. Genetic testing of female  Ambulatory referral to Genetics   3. Family history of malignant neoplasm of breast  Ambulatory referral to Genetics         Healthy Habits:   Regular Exercise: No  Sunscreen Use: Yes  Healthy Diet: Yes  Dental Visits Regularly: Yes  Seat Belt: Yes  Sexually active: No  Self Breast Exam Monthly:Yes  Hemoccults: No  Flex Sig: No  Colonoscopy: No  Lipid Profile: Yes  Glucose Screen: Yes  Prevention of Osteoporosis: Yes  Last Dexa: No  Guns at Home:  Yes  Guns Safety Locks:  Yes  Domestic Violence:  No    Current Outpatient Medications Include:    Current Outpatient Medications:      PARoxetine (PAXIL) 40 MG tablet, Take 1 tablet (40 mg total) by mouth daily., Disp: 90 tablet, Rfl: 1    Allergies:    Allergies   Allergen Reactions     Zoloft [Sertraline] Diarrhea       Past Medical History:   Diagnosis Date      Agoraphobia      Anxiety      Dermatitis      Panic state        Past Surgical History:   Procedure Laterality Date     None         OB History   No obstetric history on file.       Immunization History   Administered Date(s) Administered     Dtap 1996, 1996, 01/03/1997, 09/19/1997, 06/29/2001     HPV 9 Valent 12/10/2015     HPV Quadrivalent 09/25/2014, 12/11/2014     Hep A, Adult IM (19yr & older) 12/10/2015     Hep B, Peds or Adolescent 1996, 1996, 01/03/1997     Hepatitis A, Ped/Adol 2 Dose IM (18yr & under) 09/25/2014     Hib (PRP-T) 1996, 1996, 01/03/1997, 09/19/1997     IPV 1996, 1996, 01/03/1997, 06/29/2001     Influenza, Live, Nasal LAIV3 09/11/2008, 10/16/2009     Influenza, Seasonal, Inj PF IIV3 01/05/2011, 12/23/2011     Influenza,seasonal, Inj IIV3 10/17/2007     MMR 06/25/1997, 06/29/2001, 10/29/2001     Meningococcal MCV4O 09/11/2013     Td, adult adsorbed, PF 09/25/2014     Tdap 09/11/2007     Varicella 06/25/1997, 09/11/2007       Family History   Problem Relation Age of Onset     Skin cancer Mother      Diabetes Father      Hyperlipidemia Father      No Medical Problems Brother      Lung cancer Maternal Grandmother      Diabetes Maternal Grandfather      Lymphoma Paternal Grandmother      Diabetes Paternal Grandfather      Anxiety disorder Maternal Aunt        Social History     Socioeconomic History     Marital status: Single     Spouse name: Not on file     Number of children: 0     Years of education: 14     Highest education level: Not on file   Occupational History     Occupation: veterinary tech     Employer: [employer]   Social Needs     Financial resource strain: Not on file     Food insecurity:     Worry: Not on file     Inability: Not on file     Transportation needs:     Medical: Not on file     Non-medical: Not on file   Tobacco Use     Smoking status: Never Smoker     Smokeless tobacco: Never Used   Substance and Sexual Activity      Alcohol use: No     Drug use: No     Sexual activity: Never   Lifestyle     Physical activity:     Days per week: Not on file     Minutes per session: Not on file     Stress: Not on file   Relationships     Social connections:     Talks on phone: Not on file     Gets together: Not on file     Attends Hinduism service: Not on file     Active member of club or organization: Not on file     Attends meetings of clubs or organizations: Not on file     Relationship status: Not on file     Intimate partner violence:     Fear of current or ex partner: Not on file     Emotionally abused: Not on file     Physically abused: Not on file     Forced sexual activity: Not on file   Other Topics Concern     Not on file   Social History Narrative    Lives with parents, was home schooled all her life up until age 19       Last cholesterol:   Lab Results   Component Value Date    CHOL 224 (H) 10/17/2018     Lab Results   Component Value Date    HDL 46 (L) 10/17/2018     Lab Results   Component Value Date    LDLCALC 124 10/17/2018     Lab Results   Component Value Date    TRIG 271 (H) 10/17/2018     No components found for: CHOLHDL    MAMMO: NA      Birth Control Method: None  High Risk/Behavior:       LMP: Patient's last menstrual period was 01/08/2020 (exact date).  Menstrual Regularity: 30 days  Flow: 5 to 7 days, moderate flow      Review of Systems:   General:  Denies fever, chills, HA, fatigue, myalgias, weight change    Eyes: Denies vision changes   Ears/Nose/Throat: Denies nasal congestion, rhinorrhea, ear pain or discharge, sore throat, swollen glands  Cardiovascular: Denies CP, palpitations  Respiratory:  Denies SOB, cough  Gastrointestinal:  Denies changes in bowel habits, melena, rectal bleeding,  Genitourinary: Denies changes in urine habits/frequency/dysuria, hematuria   Musculoskeletal:  Denies  joint pain or swelling or erythema, edema  Skin: Denies rashes   Neurologic: Denies weakness, paresthesia  Psychiatric: Denies  "mood changes   Endocrine: Denies polyuria, polydipsia, polyphagia  Heme/Lymphatic: Denies problem with bleeding   Allergic/Immunologic: Denies problem     POSITIVES: genetic testing/ mother with breast cancer      PHYSICAL EXAM:    /76   Pulse 94   Temp 98.2  F (36.8  C)   Resp 16   Ht 5' 7\" (1.702 m)   Wt 215 lb 2 oz (97.6 kg)   LMP 01/08/2020 (Exact Date)   SpO2 96%   Breastfeeding No   BMI 33.69 kg/m      Wt Readings from Last 3 Encounters:   01/28/20 215 lb 2 oz (97.6 kg)   01/21/20 217 lb 6 oz (98.6 kg)   07/16/19 192 lb (87.1 kg)       Body mass index is 33.69 kg/m .    Well developed, well nourished, no acute distress.  HEENT: normocephalic/atraumatic  EYES:PERRLA/EOMI, no redness, swelling or drainage  EARS: TMs: Gray, normal light reflex   NOSE:  no nasal discharge.    MOUTH: Oral mucosa: no erythema/exudate  Neck: No LAD/masses/thyromegaly/bruits  Lungs: clear bilaterally  Heart: regular rate and rhythm, no murmurs/gallops/rubs  Breasts: symmetric, no masses/skin changes, nipple discharge, or axillary LAD.  BSE reviewed.  Abdomen: Normal bowel sounds, soft, non-tender, non-distended, no masses, neg Herrera's/McBurney's, no rebound/guarding  Genital: Normal external genitalia, no discharge, no lesions, cervix is non-friable, os is closed, no CMT, no adnexal tenderness or fullness. Moderate amount of thick, white vaginal discharge, no odor. Uterus is not enlarged, perineum intact.  Thin prep done.    Rectal: internal exam is deferred.  External exam is normal.  Lymphatics: no supraclavicular/axillary/epitrochlear/inguinal LAD. No edema.  Neuro: A&O x 3, CN II-XII intact, strength 5/5, reflexes symmetric, sensory intact to light touch.  Psych: Behavior appropriate, engaging.  Thought processes congruent, non-tangential.  Musculoskeletal: no gross deformities.  Skin: no rashes or lesions, no atypical mole, pink, warm and dry      No results found for this or any previous visit (from the past 240 " hour(s)).    ASSESSMENT/PLAN: 24 y.o. female physical exam and pap smear.      The following high BMI interventions were performed this visit: encouragement to exercise and weight monitoring    1. Annual physical exam    - Thyroid Stimulating Hormone (TSH)  - Basic Metabolic Panel  - Hemoglobin  - Vitamin D, Total (25-Hydroxy)  - Lipid Cascade  - HIV Antigen/Antibody Screening [place]  - Gynecologic Cytology (PAP Smear)  -continue with monthly BSE  -Routine health maintenance discussion:  No smoking, limited alcohol (7 or less servings per week), 5 fruits/veg servings per day, 200 minutes of exercise per week.  Daily calcium/vitamin D guidelines, bone health, yearly mammogram after age 39/regular pap smears/colon cancer screening beginning at age 50.  Accident avoidance, sun screen.  -regular exercise  -weight loss    2. Genetic testing of female    - Ambulatory referral to Genetics  -Mother is undergoing chemotherapy and radiation for breast cancer, her mother did test negative for the BRCA gene  -Her maternal great grandmother also has a history of ovarian versus uterine cancer, patient is uncertain what type but she does note was reproductive        3. Family history of malignant neoplasm of breast    - Ambulatory referral to Genetics  -Mother is undergoing chemotherapy and radiation for breast cancer, her mother did test negative for the BRCA gene  -Her maternal great grandmother also has a history of ovarian versus uterine cancer, patient is uncertain what type but she does note was reproductive      There are no discontinued medications.      Follow up in 6 months for anxiety and depression    Will contact her with the results of the labs when available.    Marbella Rodriguez , CNP

## 2021-06-10 NOTE — PATIENT INSTRUCTIONS - HE
I sent the refill for the scalp and a cream for the ankles. I've given the card to see if it will help with cost.    You'll get a call about seeing audiology. Let me know if you don't hear anything.    Refills of paxil sent to the pharmacy.

## 2021-06-10 NOTE — PROGRESS NOTES
"Chief Complaint   Patient presents with     Medication Management     Eczema     Would like a refill of medication for eczema that she uses on the back of the head.        HPI: Patient presents today for a med check and also would like refills of medication that she uses for eczema.  Continues with Paxil.  Symptoms are under okay control, but she admits that life stressors are making her feel more anxious than usual.  No recent panic attacks.  No suicidal ideation.    PHQ-9 Total Score: 7 (8/5/2020 11:00 AM)  CHELA 7 Total Score: 4 (8/5/2020 11:00 AM)    Patient notes that she gets frequent eczema along her hairline towards the back of her head.  She was prescribed Lidex for this by dermatologist a few years ago and would like a refill.  Uses when it flares up.  She also notes similar symptoms along her ankles.  At times it is itchy.  She will get dry flaking skin.    Lastly, patient notes that over the last year her hearing seems to be decreasing.  No pain within the ears.  No trauma.      ROS:Review of Systems - negative except for what's listed in the HPI    SH: The Patient's  reports that she has never smoked. She has never used smokeless tobacco. She reports that she does not drink alcohol or use drugs.      FH: The Patient's family history includes Anxiety disorder in her maternal aunt; Diabetes in her father, maternal grandfather, and paternal grandfather; Hyperlipidemia in her father; Lung cancer in her maternal grandmother; Lymphoma in her paternal grandmother; No Medical Problems in her brother; Skin cancer in her mother.     Meds:    No current outpatient medications on file prior to visit.     No current facility-administered medications on file prior to visit.        O:  /72   Pulse 91   Ht 5' 6\" (1.676 m)   Wt 220 lb (99.8 kg)   LMP 07/11/2020 (Approximate)   SpO2 98%   BMI 35.51 kg/m      Physical Examination:   General appearance - alert, well appearing, and in no distress  Mental status - " alert, oriented to person, place, and time  Ears - bilateral TM's and external ear canals normal  Nose - normal and patent, no erythema, discharge or polyps  Chest - clear to auscultation, no wheezes, rales or rhonchi, symmetric air entry  Heart - normal rate and regular rhythm, S1 and S2 normal, no murmurs noted  Neurological - cranial nerves II through XII intact  Extremities - peripheral pulses normal, no pedal edema, no cyanosis  Skin -patch of eczema along the posterior neck at the hairline measuring about 2 cm.  Similar issues along both ankles.      A/P:     Problem List Items Addressed This Visit     Generalized anxiety disorder    Relevant Medications    PARoxetine (PAXIL) 40 MG tablet    Panic disorder with agoraphobia    Relevant Medications    PARoxetine (PAXIL) 40 MG tablet      Other Visit Diagnoses     Eczema, unspecified type    -  Primary    Relevant Medications    fluocinonide (LIDEX) 0.05 % external solution    fluocinonide-emollient (FLUOCINONIDE-EMOLLIENT) 0.05 % Crea    Decreased hearing of both ears        Relevant Orders    Ambulatory referral to Audiology        Refill Paxil sent to the pharmacy.  Discussed with the patient to let us know should symptoms been controlled.  Refill of the Lidex solution for the hair.  She admits that sometimes she will use her mother's fluocinonide for the ankles which is very beneficial.  She would like a prescription of this for herself.  No anatomical abnormalities found within the ears to explain hearing loss.  Audiology referral placed.    1. Generalized anxiety disorder  - PARoxetine (PAXIL) 40 MG tablet; Take 1 tablet (40 mg total) by mouth daily.  Dispense: 90 tablet; Refill: 1    2. Panic disorder with agoraphobia  - PARoxetine (PAXIL) 40 MG tablet; Take 1 tablet (40 mg total) by mouth daily.  Dispense: 90 tablet; Refill: 1    3. Eczema, unspecified type    - fluocinonide (LIDEX) 0.05 % external solution; Apply topically 2 (two) times a day.  Dispense:  60 mL; Refill: 0  - fluocinonide-emollient (FLUOCINONIDE-EMOLLIENT) 0.05 % Crea; Apply 1 application topically 2 (two) times a day as needed.  Dispense: 30 g; Refill: 0    4. Decreased hearing of both ears    - Ambulatory referral to Audiology        Kevin Sullivan, CNP
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
## 2021-06-16 PROBLEM — F40.01 PANIC DISORDER WITH AGORAPHOBIA: Status: ACTIVE | Noted: 2019-04-25

## 2021-06-16 PROBLEM — Z80.3 FAMILY HISTORY OF MALIGNANT NEOPLASM OF BREAST: Status: ACTIVE | Noted: 2020-01-28

## 2021-06-20 NOTE — PROGRESS NOTES
Office Visit - New Patient   Darby Lopez   23 y.o.  female    Date of visit: 9/18/2018  Physician: Marbella Rodriguez CNP     Assessment and Plan   1. Generalized anxiety disorder  sertraline (ZOLOFT) 50 MG tablet   2. Establishing care with new doctor, encounter for           Body Mass Index was not assessed due to normal BMI.    Return in about 4 weeks (around 10/16/2018) for anxiety, annual physical.     Chief Complaint   Chief Complaint   Patient presents with     Establish Care     Transfer from [facility]        Patient Profile   Social History     Social History Narrative    Lives with parents, was home schooled all her life up until age 19        Past Medical History   Patient Active Problem List   Diagnosis     Generalized anxiety disorder     Panic disorder without agoraphobia       Past Surgical History  She has a past surgical history that includes None.     History of Present Illness   This 23 y.o. old female patient presents to establish and discuss anxiety.  Past medical, surgical, and family history reviewed with patient.  Patient has no current medical problems.  She is currently working as a  in [place], [place].  She lives at home with her parents and younger brother.  She was home schooled, graduated in 2014 and then attended [employer] for [email or web address] training.  She does not smoke or drink alcohol.  She denies current or past use of elicit drugs.  She reports eating a relatively healthy diet, 2-3 meals per day and has been trying to cut back on snacks.  She does not drink soda or caffeine.  She is very active at work but does not exercise regularly.  She has never been sexually active.     Patient has a history of anxiety that she reports started when she was 9 years old.  She reports being generally anxious all the time with nervousness and irritability.  Activities that increase her anxiety are related to being away from home out of her normal routine.  She will  "become so anxious that she is has full blown panic attacks with heart racing, shortness of breath, and chest tightness.  Being alone of with other people does not make a difference in her symptoms.  If she is going to take a walk outside her home she has to be able to see the house at all times.  She saw a counselor in middle school and was able to learn some coping skills to deal with her symptoms.  She will use lavender oil, chew gum or listen to music.  She denies thoughts of self harm or suicide.  She denies insomnia.  PHQ 9 score today is 3 and Sandip 7 score is 12.Was previously taking Prozac. States medication worked for a while then wore off and was no longer helpful. She would like to explore other options of medications today, she is not interested in mental health counseling at this time.  Vital signs are stable today.       Review of Systems: A comprehensive review of systems was negative except as noted.     Medications and Allergies   Current Outpatient Prescriptions   Medication Sig Dispense Refill     sertraline (ZOLOFT) 50 MG tablet Take 1 tablet (50 mg total) by mouth daily. 30 tablet 1     No current facility-administered medications for this visit.      No Known Allergies     Family and Social History   Family History   Problem Relation Age of Onset     Skin cancer Mother      Diabetes Father      Hyperlipidemia Father      No Medical Problems Brother      Lung cancer Maternal Grandmother      Diabetes Maternal Grandfather      Lymphoma Paternal Grandmother      Diabetes Paternal Grandfather         Social History   Substance Use Topics     Smoking status: Never Smoker     Smokeless tobacco: Never Used     Alcohol use No        Physical Exam   General Appearance:   Patient is alert, oriented, and in no acute distress.    /80  Pulse 100  Temp 98.8  F (37.1  C)  Resp 16  Ht 5' 6\" (1.676 m)  Wt 185 lb (83.9 kg)  LMP 09/14/2018  SpO2 100%  Breastfeeding? No  BMI 29.86 kg/m2      NECK: " Neck appearance was normal. There were no neck masses and the thyroid was not enlarged.  RESPIRATORY: Breathing pattern was normal and the chest moved symmetrically.  Percussion/auscultatory percussion was normal.  Lung sounds were normal and there were no abnormal sounds.  CARDIOVASCULAR: Heart rate and rhythm were normal.  S1 and S2 were normal and there were no extra sounds or murmurs. Peripheral pulses in arms and legs were normal.  Jugular venous pressure was normal.  There was no peripheral edema.  SKIN/HAIR/NAILS: Skin color was normal.  Hair and nails were normal.  PSYCHIATRIC:  Mood and affect were normal and the patient had normal recent and remote memory. The patient's judgment and insight were normal. Shy, intermittent bouts of lack of eye contact       Additional Information     Review and/or order of clinical lab tests:  Review and/or order of radiology tests:  Review and/or order of medicine tests:  Discussion of test results with performing physician:  Decision to obtain old records and/or obtain history from someone other than the patient:  Review and summarization of old records and/or obtaining history from someone other than the patient and.or discussion of case with another health care provider:  Independent visualization of image, tracing or specimen itself:    Time: total time spent with the patient was 45 minutes of which >50% was spent in counseling and coordination of care     Marbella Rodriguez, CNP  Family Nurse Practitioner  [facility]  [phone]

## 2021-06-20 NOTE — LETTER
Letter by Marbella Rodriguez NP at      Author: Marbella Rodriguez NP Service: -- Author Type: --    Filed:  Encounter Date: 1/21/2020 Status: Signed                    My Depression Action Plan  Name: Darby Lopez   Date of Birth 1995  Date: 1/21/2020    My Doctor: Marbella Rodriguez NP   My Clinic: [facility]  [address]  [place] [address]  [facility] [address]  [phone]          GREEN    ZONE   Good Control    What it looks like:     Things are going generally well. You have normal ups and downs. You may even feel depressed from time to time, but bad moods usually last less than a day.   What you need to do:  1. Continue to care for yourself (see self care plan)  2. Check your depression survival kit and update it as needed  3. Follow your physicians recommendations including any medication.  4. Do not stop taking medication unless you consult with your physician first.           YELLOW         ZONE Getting Worse    What it looks like:     Depression is starting to interfere with your life.     It may be hard to get out of bed; you may be starting to isolate yourself from others.    Symptoms of depression are starting to last most all day and this has happened for several days.     You may have suicidal thoughts but they are not constant.   What you need to do:     1. Call your care team. Your response to treatment will improve if you keep your care team informed of your progress. Yellow periods are signs an adjustment may need to be made.     2. Continue your self-care.  Just get dressed and ready for the day.  Don't give yourself time to talk yourself out of it.    3. Talk to someone in your support network.    4. Open up your depression Depression Self-Care Plan / Wellness kit.           RED    ZONE Medical Alert - Get Help    What it looks like:     Depression is seriously interfering with your life.     You may experience these or  other symptoms: You cant get out of bed most days, cant work or engage in other necessary activities, you have trouble taking care of basic hygiene, or basic responsibilities, thoughts of suicide or death that will not go away, self-injurious behavior.     What you need to do:  1. Call your care team and request a same-day appointment. If they are not available (weekends or after hours) call your local crisis line, emergency room or 911.            Self-Care Plan / Wellness Kit    Self-Care for Depression  Heres the deal. Your body and mind are really not as separate as most people think.  What you do and think affects how you feel and how you feel influences what you do and think. This means if you do things that people who feel good do, it will help you feel better.  Sometimes this is all it takes.  There is also a place for medication and therapy depending on how severe your depression is, so be sure to consult with your medical provider and/ or Behavioral Health Consultant if your symptoms are worsening or not improving.     In order to better manage my stress, I will:    Exercise  Get some form of exercise, every day. This will help reduce pain and release endorphins, the feel good chemicals in your brain. This is almost as good as taking antidepressants!  This is not the same as joining a gym and then never going! (they count on that by the way?) It can be as simple as just going for a walk or doing some gardening, anything that will get you moving.      Hygiene   Maintain good hygiene (get out of bed in the morning, make your bed, brush your teeth, take a shower, and get dressed like you were going to work, even if you are unemployed).  If your clothes don't fit try to get ones that do.    Diet  Strive to eat foods that are good for me, drink plenty of water, and avoid excessive sugar, caffeine, alcohol, and other mood-altering substances.  Some foods that are helpful in depression are: complex carbohydrates,  B vitamins, flaxseed, fish or fish oil, fresh fruits and vegetables.    Psychotherapy  Agree to participate in Individual Therapy (if recommended).    Medication  If prescribed medications, I agree to take them.  Missing doses can result in serious side effects.  I understand that drinking alcohol, or other illicit drug use, may cause potential side effects.  I will not stop my medication abruptly without first discussing it with my provider.    Staying Connected With Others  Stay in touch with my friends, family members, and my primary care provider/team.    Use your imagination  Be creative.  We all have a creative side; it doesnt matter if its oil painting, sand castles, or mud pies! This will also kick up the endorphins.    Witness Beauty  (AKA stop and smell the roses) Take a look outside, even in mid-winter. Notice colors, textures. Watch the squirrels and birds.     Service to others  Be of service to others.  There is always someone else in need.  By helping others we can get out of ourselves and remember the really important things.  This also provides opportunities for practicing all the other parts of the program.    Humor  Laugh and be silly!  Adjust your TV habits for less news and crime-drama and more comedy.    Control your stress  Try breathing deep, massage therapy, biofeedback, and meditation. Find time to relax each day.     Crisis Text Line  http://www.crisistextline.org    The Crisis Text Line serves anyone, in any type of crisis, providing access to free, 24/7 support and information via the medium people already use and trust:    Here's how it works:  1.  Text 233-699 from anywhere in the [place], anytime, about any type of crisis.  2.  A live, trained Crisis Counselor receives the text and responds quickly.  3.  The volunteer Crisis Counselor will help you move from a 'hot moment to a cool moment'.  My support system    Clinic Contact:  Phone number:    Contact 1:  Phone number:    Contact 2:   Phone number:    Muslim/:  Phone number:    Therapist:  Phone number:    [facility] center:    Phone number:    Other community support:  Phone number:

## 2021-06-21 NOTE — PROGRESS NOTES
Darby Lopez is a 23 y.o. female is here for a  Health Maintenance exam and for follow-up of her anxiety and depressive symptoms.  Medical, family and surgical history reviewed.  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  She does not drink or smoke and denies illicit drug use.  She continues to live with her parents, she does not exercise regularly.  She has never been sexually active.  She does menstruate regularly every 30 days, menstrual cycles last 5-7 days and are moderate in flow.  She was recently treated for an ear infection with antibiotic therapy on October 1.  Anxiety and depressive symptoms with acute panic is currently well controlled on sertraline 50 mg daily.  Since starting the medication 4 weeks ago she feels less panic all she is away from her home at work, she is not experiencing as many acute panic attacks, she feels that she worries less.  When asked if she has had a chance to walk away from her home outside to see how she feels, she has not had a chance to try this out because it is dark earlier in the day and she does not want to walk alone at night.  She previously had issues with walking away from her home and would get a few feet and then would experience acute panic.  She has no issues with sleep, is coping strategies include listening to music and using her lavender oil.  She has no thoughts of self-harm or plans for suicide.  Sandip 7 score is 3, PHQ 9 score 0 today.  She would like to continue on the same dose of medication.  Pap deferred per patient request since she has never been sexually active or experienced any sort of penetration. Discussed risks associated with not performing pap, patient understands risks and still declines. Vital signs are stable, she declined the influenza vaccination today.      Healthy Habits:   Regular Exercise: No, discussed  Sunscreen Use: Yes  Healthy Diet: Yes  Dental Visits Regularly: Yes  Seat Belt: Yes  Sexually  active: No  Self Breast Exam Monthly:No, discussed  Hemoccults: No  Flex Sig: No  Colonoscopy: No  Lipid Profile: Yes  Glucose Screen: Yes  Prevention of Osteoporosis: Yes  Last Dexa: No  Guns at Home:  Yes  Guns Safety Locks:  Yes  Domestic Violence:  No    Current Outpatient Medications Include:    Current Outpatient Prescriptions:      sertraline (ZOLOFT) 50 MG tablet, Take 1 tablet (50 mg total) by mouth daily., Disp: 90 tablet, Rfl: 0    Allergies:  No Known Allergies    Past Medical History:   Diagnosis Date     Agoraphobia      Anxiety      Dermatitis      Panic state        Past Surgical History:   Procedure Laterality Date     None         OB History   No data available       Immunization History   Administered Date(s) Administered     Dtap 1996, 1996, 01/03/1997, 09/19/1997, 06/29/2001     HPV 9 Valent 12/10/2015     HPV Quadrivalent 09/25/2014, 12/11/2014     Hep A, Adult IM (19yr & older) 12/10/2015     Hep B, Peds or Adolescent 1996, 1996, 01/03/1997     Hepatitis A, Ped/Adol 2 Dose IM (18yr & under) 09/25/2014     Hib (PRP-T) 1996, 1996, 01/03/1997, 09/19/1997     IPV 1996, 1996, 01/03/1997, 06/29/2001     Influenza, Live, Nasal LAIV3 09/11/2008, 10/16/2009     Influenza, Seasonal, Inj PF IIV3 01/05/2011, 12/23/2011     Influenza,seasonal, Inj IIV3 10/17/2007     MMR 06/25/1997, 06/29/2001, 10/29/2001     Meningococcal MCV4O 09/11/2013     Td, adult adsorbed, PF 09/25/2014     Tdap 09/11/2007     Varicella 06/25/1997, 09/11/2007       Family History   Problem Relation Age of Onset     Skin cancer Mother      Diabetes Father      Hyperlipidemia Father      No Medical Problems Brother      Lung cancer Maternal Grandmother      Diabetes Maternal Grandfather      Lymphoma Paternal Grandmother      Diabetes Paternal Grandfather      Anxiety disorder Maternal Aunt        Social History     Social History     Marital status: Single     Spouse name: N/A      "Number of children: 0     Years of education: 14     Occupational History     veterinary tech [employer]     Social History Main Topics     Smoking status: Never Smoker     Smokeless tobacco: Never Used     Alcohol use No     Drug use: No     Sexual activity: No     Other Topics Concern     Not on file     Social History Narrative    Lives with parents, was home schooled all her life up until age 19       Last cholesterol:   No results found for: CHOL  No results found for: HDL  No results found for: LDLCALC  No results found for: TRIG  No components found for: CHOLHDL    MAMMO: N/A      Birth Control Method: None  High Risk/Behavior:  No    LMP: Patient's last menstrual period was 10/10/2018 (exact date).  Menstrual Regularity: every 30 days  Flow: moderate, lasts 5 to 7 days, uses pads only      Review of Systems:   General:  Denies fever, chills, HA, fatigue, myalgias, weight change    Eyes: Denies vision changes   Ears/Nose/Throat: Denies nasal congestion, rhinorrhea, ear pain or discharge, sore throat, swollen glands  Cardiovascular: Denies CP, palpitations  Respiratory:  Denies SOB, cough  Gastrointestinal:  Denies changes in bowel habits, melena, rectal bleeding,  Genitourinary: Denies changes in urine habits/frequency/dysuria, hematuria   Musculoskeletal:  Denies  joint pain or swelling or erythema, edema  Skin: Denies rashes   Neurologic: Denies weakness, paresthesia  Psychiatric: Denies mood changes   Endocrine: Denies polyuria, polydipsia, polyphagia  Heme/Lymphatic: Denies problem with bleeding   Allergic/Immunologic: Denies problem     POSITIVES: anxiety, acute panic      PHYSICAL EXAM:    /70  Pulse 94  Temp 98.7  F (37.1  C)  Resp 12  Ht 5' 6\" (1.676 m)  Wt 185 lb (83.9 kg)  LMP 10/10/2018 (Exact Date)  SpO2 100%  BMI 29.86 kg/m2    Wt Readings from Last 3 Encounters:   10/17/18 185 lb (83.9 kg)   09/18/18 185 lb (83.9 kg)       Body mass index is 29.86 kg/(m^2).    Well developed, " well nourished, no acute distress.  HEENT: normocephalic/atraumatic  EYES:PERRLA/EOMI, no redness, swelling or drainage  EARS: TMs: Gray, normal light reflex   NOSE:  no nasal discharge.    MOUTH: Oral mucosa: no erythema/exudate  Neck: No LAD/masses/thyromegaly/bruits  Lungs: clear bilaterally  Heart: regular rate and rhythm, no murmurs/gallops/rubs  Breasts: symmetric, large breasted, scattered densities noted in both breasts, no masses/skin changes, nipple discharge, or axillary LAD.  BSE reviewed.  Abdomen: Normal bowel sounds, soft, non-tender, non-distended, no masses, neg Herrera's/McBurney's, no rebound/guarding  Genital: Normal external genitalia, no discharge, no lesions. Internal exam deferred per request of the patient.   Rectal: internal exam is deferred.  External exam is normal.  Lymphatics: no supraclavicular/axillary/epitrochlear/inguinal LAD. No edema.  Neuro: A&O x 3, CN II-XII intact, strength 5/5, reflexes symmetric, sensory intact to light touch.  Psych: Behavior appropriate, engaging.  Thought processes congruent, non-tangential.  Musculoskeletal: no gross deformities.  Skin: no rashes or lesions, warm and dry      Recent Results (from the past 240 hour(s))   Hemoglobin   Result Value Ref Range    Hemoglobin 12.8 12.0 - 16.0 g/dL       ASSESSMENT/PLAN: 23 y.o. female physical exam       The following are part of a depression follow up plan for the patient:  coping support assessment and emotional support assessment    1. Annual physical exam    - Thyroid Stimulating Hormone (TSH)  - Basic Metabolic Panel  - Hemoglobin  - Vitamin D, Total (25-Hydroxy)  - Lipid Cascade RANDOM    2. Generalized anxiety disorder    - sertraline (ZOLOFT) 50 MG tablet; Take 1 tablet (50 mg total) by mouth daily.  Dispense: 90 tablet; Refill: 0    3. Panic disorder without agoraphobia    - sertraline (ZOLOFT) 50 MG tablet; Take 1 tablet (50 mg total) by mouth daily.  Dispense: 90 tablet; Refill: 0    Medications  Discontinued During This Encounter   Medication Reason     sertraline (ZOLOFT) 50 MG tablet Reorder       Routine health maintenance discussion:  No smoking, limited alcohol (7 or less servings per week), 5 fruits/veg servings per day, 200 minutes of exercise per week.  Daily calcium/vitamin D guidelines, bone health, yearly mammogram after age 39/regular pap smears/colon cancer screening beginning at age 50.  Accident avoidance, sun screen.      Will contact her with the results of the labs when available.    Return to clinic in 3 months for anxiety follow up.     Marbella Rodriguez , CNP

## 2021-06-23 NOTE — PROGRESS NOTES
1. Panic disorder without agoraphobia     2. Generalized anxiety disorder           ASSESSMENT/PLAN:     The following are part of a depression follow up plan for the patient:  coping support assessment and emotional support assessment    1. Panic disorder without agoraphobia      2. Generalized anxiety disorder    -CHELA-7: 2  PHQ-9: 1  -continue on same dose of Zoloft      There are no Patient Instructions on file for this visit.  Medications Discontinued During This Encounter   Medication Reason     sertraline (ZOLOFT) 50 MG tablet Duplicate order     Return in about 6 months (around 7/17/2019) for depression, anxiety.        Marbella Rodriguez NP          SUBJECTIVE:  Darby Lopez is a 23 y.o. female who presents for reevaluation of her anxiety, agoraphobia and depression symptoms.  Patient is doing extremely well, she is currently taking sertraline 50 mg daily.  Her main concern for stressors is that she constantly worries about having a panic attack especially at work.  There are areas at the [facility] clinic where she works that that she can go to if she feels acute panic coming on.  For some reason, she feels that evening shifts are worse for her at work and mentions that the fluorescent lights tend to mess with her.  When she feels anxious, she will go into the lab were the lights are dim her and sit for a few minutes until she can collect herself.  She feels she can easily self-soothe and talk herself out of having acute panic attacks, she typically listens to music and keeps going throughout the day if she feels overwhelmed.  She is branching out and hanging out with her friends more.  She did go to [place] that several weeks ago to see a friend for a music recital, things went really well even though she did feel anxious multiple times.  She is planning on taking a trip with some girlfriends for another friend of hers birthday, she is looking forward to this.  She also went to her holiday party for  work, she did have a few episodes where she thought she was going to have a panic attack and then she did not.  She did forget to take her medications for a few days in a row, she did notice increased fatigue, worry, anxiousness when she missed a few doses and she now realizes how important it is to take it every day.  She denies any thoughts of self-harm or plans for suicide, she has no history of self mutilation or previous attempts.  She would like to continue on the same dose and she will follow-up again with me in 6 months. VSS.  Chief Complaint   Patient presents with     Follow Up     Med Check -          Patient Active Problem List   Diagnosis     Generalized anxiety disorder     Panic disorder without agoraphobia       Current Outpatient Medications   Medication Sig Dispense Refill     sertraline (ZOLOFT) 50 MG tablet TAKE 1 TABLET (50 MG TOTAL) BY MOUTH ONCE DAILY 30 tablet 9     No current facility-administered medications for this visit.        Social History     Tobacco Use   Smoking Status Never Smoker   Smokeless Tobacco Never Used       REVIEW OF SYSTEMS: Denies restlessness, on edge, easily fatigues, irritability, disturbed sleep, decreased concentration, muscle tension, fear of health.      TOBACCO USE:  Social History     Tobacco Use   Smoking Status Never Smoker   Smokeless Tobacco Never Used     Social History     Socioeconomic History     Marital status: Single     Spouse name: Not on file     Number of children: 0     Years of education: 14     Highest education level: Not on file   Social Needs     Financial resource strain: Not on file     Food insecurity - worry: Not on file     Food insecurity - inability: Not on file     Transportation needs - medical: Not on file     Transportation needs - non-medical: Not on file   Occupational History     Occupation: veterinary tech     Employer: [employer]   Tobacco Use     Smoking status: Never Smoker     Smokeless tobacco: Never Used    Substance and Sexual Activity     Alcohol use: No     Drug use: No     Sexual activity: No   Other Topics Concern     Not on file   Social History Narrative    Lives with parents, was home schooled all her life up until age 19         OBJECTIVE:            Vitals:    01/17/19 1400   BP: 106/60   Pulse: 96   Resp: 14   Temp: 98.9  F (37.2  C)   SpO2: 100%     Weight: 191 lb 5 oz (86.8 kg)    Wt Readings from Last 3 Encounters:   01/17/19 191 lb 5 oz (86.8 kg)   10/17/18 185 lb (83.9 kg)   09/18/18 185 lb (83.9 kg)     Body mass index is 30.88 kg/m .        Physical Exam:  GENERAL APPEARANCE: A&A, NAD, well hydrated, well nourished  CV: RRR, no M/G/R   LUNGS: CTAB, normal respiratory effort  ABDOMEN: S&NT, no masses, no organomegaly, BS present x4   SKIN:  Normal skin turgor, no lesions/rashes, warm and dry   PSYCHIATRIC;  Mood appropriate, memory intact, good eye contact, engaged in conversation

## 2021-07-27 ENCOUNTER — OFFICE VISIT (OUTPATIENT)
Dept: FAMILY MEDICINE | Facility: CLINIC | Age: 26
End: 2021-07-27
Payer: COMMERCIAL

## 2021-07-27 VITALS
WEIGHT: 217 LBS | OXYGEN SATURATION: 100 % | SYSTOLIC BLOOD PRESSURE: 116 MMHG | DIASTOLIC BLOOD PRESSURE: 80 MMHG | HEART RATE: 82 BPM | HEIGHT: 68 IN | BODY MASS INDEX: 32.89 KG/M2 | RESPIRATION RATE: 16 BRPM

## 2021-07-27 DIAGNOSIS — Z71.89 ACP (ADVANCE CARE PLANNING): ICD-10-CM

## 2021-07-27 DIAGNOSIS — F41.9 ANXIETY: ICD-10-CM

## 2021-07-27 DIAGNOSIS — F33.0 MILD EPISODE OF RECURRENT MAJOR DEPRESSIVE DISORDER (H): ICD-10-CM

## 2021-07-27 DIAGNOSIS — Z00.00 ANNUAL PHYSICAL EXAM: Primary | ICD-10-CM

## 2021-07-27 DIAGNOSIS — R21 RASH AND NONSPECIFIC SKIN ERUPTION: ICD-10-CM

## 2021-07-27 LAB
ANION GAP SERPL CALCULATED.3IONS-SCNC: 18 MMOL/L (ref 5–18)
BUN SERPL-MCNC: 9 MG/DL (ref 8–22)
CALCIUM SERPL-MCNC: 10.1 MG/DL (ref 8.5–10.5)
CHLORIDE BLD-SCNC: 101 MMOL/L (ref 98–107)
CHOLEST SERPL-MCNC: 260 MG/DL
CO2 SERPL-SCNC: 20 MMOL/L (ref 22–31)
CREAT SERPL-MCNC: 0.75 MG/DL (ref 0.6–1.1)
FASTING STATUS PATIENT QL REPORTED: YES
GFR SERPL CREATININE-BSD FRML MDRD: >90 ML/MIN/1.73M2
GLUCOSE BLD-MCNC: 115 MG/DL (ref 70–125)
HDLC SERPL-MCNC: 44 MG/DL
HGB BLD-MCNC: 13.7 G/DL (ref 11.7–15.7)
LDLC SERPL CALC-MCNC: 161 MG/DL
POTASSIUM BLD-SCNC: 4.6 MMOL/L (ref 3.5–5)
SODIUM SERPL-SCNC: 139 MMOL/L (ref 136–145)
TRIGL SERPL-MCNC: 277 MG/DL

## 2021-07-27 PROCEDURE — 80048 BASIC METABOLIC PNL TOTAL CA: CPT | Performed by: NURSE PRACTITIONER

## 2021-07-27 PROCEDURE — 36415 COLL VENOUS BLD VENIPUNCTURE: CPT | Performed by: NURSE PRACTITIONER

## 2021-07-27 PROCEDURE — 86803 HEPATITIS C AB TEST: CPT | Performed by: NURSE PRACTITIONER

## 2021-07-27 PROCEDURE — 85018 HEMOGLOBIN: CPT | Performed by: NURSE PRACTITIONER

## 2021-07-27 PROCEDURE — 99395 PREV VISIT EST AGE 18-39: CPT | Performed by: NURSE PRACTITIONER

## 2021-07-27 PROCEDURE — 80061 LIPID PANEL: CPT | Performed by: NURSE PRACTITIONER

## 2021-07-27 PROCEDURE — 82306 VITAMIN D 25 HYDROXY: CPT | Performed by: NURSE PRACTITIONER

## 2021-07-27 PROCEDURE — 99213 OFFICE O/P EST LOW 20 MIN: CPT | Mod: 25 | Performed by: NURSE PRACTITIONER

## 2021-07-27 RX ORDER — TRIAMCINOLONE ACETONIDE 1 MG/G
CREAM TOPICAL 3 TIMES DAILY
Qty: 80 G | Refills: 0 | Status: SHIPPED | OUTPATIENT
Start: 2021-07-27 | End: 2021-09-15

## 2021-07-27 RX ORDER — PAROXETINE 40 MG/1
40 TABLET, FILM COATED ORAL DAILY
Qty: 90 TABLET | Refills: 1 | Status: SHIPPED | OUTPATIENT
Start: 2021-07-27 | End: 2022-02-15

## 2021-07-27 RX ORDER — PAROXETINE 40 MG/1
40 TABLET, FILM COATED ORAL DAILY
Qty: 90 TABLET | Refills: 1 | Status: CANCELLED | OUTPATIENT
Start: 2021-07-27

## 2021-07-27 ASSESSMENT — ANXIETY QUESTIONNAIRES
5. BEING SO RESTLESS THAT IT IS HARD TO SIT STILL: NOT AT ALL
1. FEELING NERVOUS, ANXIOUS, OR ON EDGE: SEVERAL DAYS
IF YOU CHECKED OFF ANY PROBLEMS ON THIS QUESTIONNAIRE, HOW DIFFICULT HAVE THESE PROBLEMS MADE IT FOR YOU TO DO YOUR WORK, TAKE CARE OF THINGS AT HOME, OR GET ALONG WITH OTHER PEOPLE: SOMEWHAT DIFFICULT
4. TROUBLE RELAXING: NOT AT ALL
3. WORRYING TOO MUCH ABOUT DIFFERENT THINGS: NOT AT ALL
6. BECOMING EASILY ANNOYED OR IRRITABLE: NOT AT ALL
2. NOT BEING ABLE TO STOP OR CONTROL WORRYING: NOT AT ALL
7. FEELING AFRAID AS IF SOMETHING AWFUL MIGHT HAPPEN: NOT AT ALL
GAD7 TOTAL SCORE: 1

## 2021-07-27 ASSESSMENT — PATIENT HEALTH QUESTIONNAIRE - PHQ9: SUM OF ALL RESPONSES TO PHQ QUESTIONS 1-9: 2

## 2021-07-27 ASSESSMENT — MIFFLIN-ST. JEOR: SCORE: 1773.84

## 2021-07-27 NOTE — PROGRESS NOTES
SUBJECTIVE:   CC: Darby Lopez is an 26 year old woman who presents for preventive health visit and med check      Patient has been advised of split billing requirements and indicates understanding: Yes  Healthy Habits:     Getting at least 3 servings of Calcium per day:  Yes    Bi-annual eye exam:  Yes    Dental care twice a year:  NO    Sleep apnea or symptoms of sleep apnea:  None    Diet:  Regular (no restrictions)    Frequency of exercise:  2-3 days/week    Duration of exercise:  15-30 minutes    Taking medications regularly:  Yes    Barriers to taking medications:  None    Medication side effects:  Other    PHQ-2 Total Score: 0    Additional concerns today:  Yes    Medical, family and surgical history reviewed.  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  She does not drink or smoke and denies illicit drug use.  She continues to live with her parents, she does not exercise regularly.  She has never been sexually active.  She does menstruate regularly every 30 days, menstrual cycles last 5 days and are heavy to moderate in flow. She does not use contraception. Cramps have been rather painful recently. Mother with history of fibroids. She is not vaccinated for COVID. She does not have a health care directive.      Her mother is currently being treated for breast cancer with chemotherapy and radiation.  Her mother did test negative for the BRCA gene.  Additional family history includes reproductive cancer in her great grandmother on the maternal side.  Patient is unsure if it was ovarian, cervical or uterine.  She did complete her initial genetic testing consult, they did recommend that she collect more family history and drop it off at the genetic clinic so they could review this and determine if she needs any further evaluation.  Currently, she is not undergoing any genetic testing herself    Anxiety/depression: Mood is stable on her current dose of paroxetine 40 mg daily.   The only time she feels any sort of panic sensation is when she is out in public, around large crowds or at any doctor/dental appointments.  She does note some irritability and anxiety when she is around her parents when they are drinking too much, this has been a problem before in the past for the last several years.  She has gotten better about setting boundaries with her parents, she recently had an episode where her mother was complaining about her /her father to the patient and the patient told her mother she did not want to be in the middle of their argument.  She is not seeing a therapist at this time, she does not feel it is necessary.  She does continue to exercise regularly, she does work on art, she does enjoy being around pets, reading, getting outside, taking trips with her friends and is working on setting boundaries for coping strategies.  She denies any suicidal ideation today.    Rash: For the last several weeks, she has been treating some ringworm that started on the ankles with over-the-counter creams that include fluconazole, clotrimazole and Lotrimin.  She states that the rash has now gotten quite inflamed and red.  She does note significant itching throughout the day, she does wake up at night sometimes to itch.,  She has not applied any topical hydrocortisone to the area.  She is not taking any oral Benadryl or antihistamines for the itching.  She does note that her brother had ringworm which is where she suspects she contracted it from since they do show the same shower.  She also works closely with animals at the [employer]          Today's PHQ-2 Score:   PHQ-2 ( 1999 Pfizer) 7/27/2021   Q1: Little interest or pleasure in doing things 0   Q2: Feeling down, depressed or hopeless 0   PHQ-2 Score 0   Q1: Little interest or pleasure in doing things Not at all   Q2: Feeling down, depressed or hopeless Not at all   PHQ-2 Score 0       Abuse: Current or Past (Physical, Sexual or  Emotional) - No  Do you feel safe in your environment? YES        Social History     Tobacco Use     Smoking status: Never Smoker     Smokeless tobacco: Never Used   Substance Use Topics     Alcohol use: No     If you drink alcohol do you typically have >3 drinks per day or >7 drinks per week? No    Alcohol Use 7/27/2021   Prescreen: >3 drinks/day or >7 drinks/week? No       Reviewed orders with patient.  Reviewed health maintenance and updated orders accordingly - Yes  Lab work is in process    Breast Cancer Screening:  Any new diagnosis of family breast, ovarian, or bowel cancer? No    FHS-7: No flowsheet data found.  Already completed genetic counseling. Mother tested negative for BRCA gene  Patient under 40 years of age: Routine Mammogram Screening not recommended.   Pertinent mammograms are reviewed under the imaging tab.    History of abnormal Pap smear: NO - age 21-29 PAP every 3 years recommended  PAP / HPV Latest Ref Rng & Units 1/28/2020   PAP Negative for squamous intraepithelial lesion or malignancy. Negative for squamous intraepithelial lesion or malignancy  Electronically signed by Betsy Louise CT (ASCP) on 1/29/2020 at  3:34 PM       Reviewed and updated as needed this visit by clinical staff   Allergies  Meds              Reviewed and updated as needed this visit by Provider                Past Medical History:   Diagnosis Date     Agoraphobia      Anxiety      Dermatitis      Panic state         Review of Systems  CONSTITUTIONAL: NEGATIVE for fever, chills, change in weight  INTEGUMENTARY/SKIN: lumps or bumps with moderate erythema, pruritis ankles bilateral and rash ankles bilateral  EYES: NEGATIVE for vision changes or irritation  ENT: NEGATIVE for ear, mouth and throat problems  RESP: NEGATIVE for significant cough or SOB  BREAST: NEGATIVE for masses, tenderness or discharge  CV: NEGATIVE for chest pain, palpitations or peripheral edema  GI: NEGATIVE for nausea, abdominal pain,  heartburn, or change in bowel habits  : NEGATIVE for unusual urinary or vaginal symptoms. Periods are regular.  MUSCULOSKELETAL: NEGATIVE for significant arthralgias or myalgia  NEURO: NEGATIVE for weakness, dizziness or paresthesias  PSYCHIATRIC: NEGATIVE for changes in mood or affect     OBJECTIVE:   There were no vitals taken for this visit.  Physical Exam  GENERAL: healthy, alert and no distress  EYES: Eyes grossly normal to inspection, PERRL and conjunctivae and sclerae normal  HENT: ear canals and TM's normal, nose and mouth without ulcers or lesions  NECK: no adenopathy, no asymmetry, masses, or scars and thyroid normal to palpation  RESP: lungs clear to auscultation - no rales, rhonchi or wheezes  BREAST: normal without masses, tenderness or nipple discharge and no palpable axillary masses or adenopathy  CV: regular rate and rhythm, normal S1 S2, no S3 or S4, no murmur, click or rub, no peripheral edema and peripheral pulses strong  ABDOMEN: soft, nontender, no hepatosplenomegaly, no masses and bowel sounds normal   (female): normal female external genitalia, normal urethral meatus, vaginal mucosa pink, moist, well rugated, and normal cervix/adnexa/uterus without masses or discharge  MS: no gross musculoskeletal defects noted, no edema  SKIN: dermatitis like rash on bilateral ankles. Moderate redness and swelling present, scattered areas of skin cracking.No bleeding or drainage present. Borders of rash are irregular.   NEURO: Normal strength and tone, mentation intact and speech normal  PSYCH: mentation appears normal, affect normal/bright    Diagnostic Test Results:  Labs reviewed in Epic    ASSESSMENT/PLAN:   1. Annual physical exam    - BASIC METABOLIC PANEL; Future  - Hemoglobin; Future  - Hepatitis C antibody; Future  - Lipid Profile; Future  - Vitamin D Deficiency; Future  - REVIEW OF HEALTH MAINTENANCE PROTOCOL ORDERS  She is not COVID vaccinated    2. Anxiety    - PARoxetine (PAXIL) 40 MG tablet;  "Take 1 tablet (40 mg) by mouth daily  Dispense: 90 tablet; Refill: 1  Mood stable on current dose  No changes made to Rx  She is NOT seeing a therapist right now  Denies suicidal ideation    3. Mild episode of recurrent major depressive disorder (H)    - PARoxetine (PAXIL) 40 MG tablet; Take 1 tablet (40 mg) by mouth daily  Dispense: 90 tablet; Refill: 1    4. Rash and nonspecific skin eruption    - triamcinolone (KENALOG) 0.1 % external cream; Apply topically 3 times daily for 10 days  Dispense: 80 g; Refill: 0  Patient instructed to stop using the topical fungal cream since it is not effective  Keep area clean and dry  Avoid itching, may use Benadryl at night and Zyrtec during the day    5. ACP (advance care planning)    Honoring choices packet given to patient for completion, she will return to the clinic once notarized    Patient has been advised of split billing requirements and indicates understanding: Yes  COUNSELING:  Reviewed preventive health counseling, as reflected in patient instructions       Regular exercise       Healthy diet/nutrition       Vision screening       Alcohol Use       Contraception       Safe sex practices/STD prevention       Consider Hep C screening for all patients one time for ages 18-79 years       Advance Care Planning    Estimated body mass index is 35.51 kg/m  as calculated from the following:    Height as of 8/5/20: 1.676 m (5' 6\").    Weight as of 8/5/20: 99.8 kg (220 lb).    Weight management plan: Discussed healthy diet and exercise guidelines    She reports that she has never smoked. She has never used smokeless tobacco.     43 minutes spent with chart review, review results, assessment, documentation      Counseling Resources:  ATP IV Guidelines  Pooled Cohorts Equation Calculator  Breast Cancer Risk Calculator  BRCA-Related Cancer Risk Assessment: FHS-7 Tool  FRAX Risk Assessment  ICSI Preventive Guidelines  Dietary Guidelines for Americans, 2010  USDA's MyPlate  ASA " Prophylaxis  Lung CA Screening    Marbella Rodriguez NP  [facility]

## 2021-07-28 LAB
DEPRECATED CALCIDIOL+CALCIFEROL SERPL-MC: 25 UG/L (ref 30–80)
HCV AB SERPL QL IA: NEGATIVE

## 2021-09-15 DIAGNOSIS — R21 RASH AND NONSPECIFIC SKIN ERUPTION: ICD-10-CM

## 2021-09-15 RX ORDER — TRIAMCINOLONE ACETONIDE 1 MG/G
CREAM TOPICAL 3 TIMES DAILY
Qty: 80 G | Refills: 0 | Status: SHIPPED | OUTPATIENT
Start: 2021-09-15

## 2021-10-11 ENCOUNTER — HEALTH MAINTENANCE LETTER (OUTPATIENT)
Age: 26
End: 2021-10-11

## 2021-10-25 ENCOUNTER — LAB (OUTPATIENT)
Dept: FAMILY MEDICINE | Facility: CLINIC | Age: 26
End: 2021-10-25
Attending: PHYSICIAN ASSISTANT

## 2021-10-25 ENCOUNTER — E-VISIT (OUTPATIENT)
Dept: URGENT CARE | Facility: CLINIC | Age: 26
End: 2021-10-25
Payer: COMMERCIAL

## 2021-10-25 ENCOUNTER — TELEPHONE (OUTPATIENT)
Dept: FAMILY MEDICINE | Facility: CLINIC | Age: 26
End: 2021-10-25

## 2021-10-25 DIAGNOSIS — Z20.822 SUSPECTED COVID-19 VIRUS INFECTION: Primary | ICD-10-CM

## 2021-10-25 DIAGNOSIS — Z20.822 SUSPECTED COVID-19 VIRUS INFECTION: ICD-10-CM

## 2021-10-25 LAB — SARS-COV-2 RNA RESP QL NAA+PROBE: POSITIVE

## 2021-10-25 PROCEDURE — U0005 INFEC AGEN DETEC AMPLI PROBE: HCPCS

## 2021-10-25 PROCEDURE — 99421 OL DIG E/M SVC 5-10 MIN: CPT | Performed by: PHYSICIAN ASSISTANT

## 2021-10-25 PROCEDURE — U0003 INFECTIOUS AGENT DETECTION BY NUCLEIC ACID (DNA OR RNA); SEVERE ACUTE RESPIRATORY SYNDROME CORONAVIRUS 2 (SARS-COV-2) (CORONAVIRUS DISEASE [COVID-19]), AMPLIFIED PROBE TECHNIQUE, MAKING USE OF HIGH THROUGHPUT TECHNOLOGIES AS DESCRIBED BY CMS-2020-01-R: HCPCS

## 2021-10-25 NOTE — PATIENT INSTRUCTIONS
Dear Darby Lopez,    Your symptoms show that you may have coronavirus (COVID-19). This illness can cause fever, cough and trouble breathing. Many people get a mild case and get better on their own. Some people can get very sick.    Will I be tested for COVID-19?  We would like to test you for Covid-19 virus. I have placed orders for this test.     To schedule: go to your [employer] home page and scroll down to the section that says  You have an appointment that needs to be scheduled  and click the large green button that says  Schedule Now  and follow the steps to find the next available openings.    If you are unable to complete these [employer] scheduling steps, please call [phone] to schedule your testing.     Return to work/school/ guidance:  Please let your workplace manager and staffing office know when your quarantine ends     We can t give you an exact date as it depends on the above. You can calculate this on your own or work with your manager/staffing office to calculate this. (For example if you were exposed on 10/4, you would have to quarantine for 14 full days. That would be through 10/18. You could return on 10/19.)      If you receive a positive COVID-19 test result, follow the guidance of the those who are giving you the results. Usually the return to work is 10 (or in some cases 20 days from symptom onset.) If you work at [facility], you must also be cleared by Employee Occupational Health and Safety to return to work.        If you receive a negative COVID-19 test result and did not have a high risk exposure to someone with a known positive COVID-19 test, you can return to work once you're free of fever for 24 hours without fever-reducing medication and your symptoms are improving or resolved.      If you receive a negative COVID-19 test and If you had a high risk exposure to someone who has tested positive for COVID-19 then you can return to work 14 days after your last contact  with the positive individual    Note: If you have ongoing exposure to the covid positive person, this quarantine period may be more than 14 days. (For example, if you are continued to be exposed to your child who tested positive and cannot isolate from them, then the quarantine of 7-14 days can't start until your child is no longer contagious. This is typically 10 days from onset of the child's symptoms. So the total duration may be 17-24 days in this case.)    Sign up for [employer].   We know it's scary to hear that you might have COVID-19. We want to track your symptoms to make sure you're okay over the next 2 weeks. Please look for an email from [employer]--this is a free, online program that we'll use to keep in touch. To sign up, follow the link in the email you will receive. Learn more at [email or web address]    How can I take care of myself?    Get lots of rest. Drink extra fluids (unless a doctor has told you not to)    Take Tylenol (acetaminophen) or ibuprofen for fever or pain. If you have liver or kidney problems, ask your family doctor if it's okay to take Tylenol o ibuprofen    If you have other health problems (like cancer, heart failure, an organ transplant or severe kidney disease): Call your specialty clinic if you don't feel better in the next 2 days.    Know when to call 911. Emergency warning signs include:  o Trouble breathing or shortness of breath  o Pain or pressure in the chest that doesn't go away  o Feeling confused like you haven't felt before, or not being able to wake up  o Bluish-colored lips or face    Where can I get more information?  [facility] - About COVID-19:   [email or web address]    CDC - What to Do If You're Sick:   www.cdc.gov/coronavirus/2019-ncov/about/steps-when-sick.html    October 25, 2021  RE:  Darby Lopez                                                                                                                  [id number]  [place]  [facility] [address]      To whom it may concern:    I evaluated Darby Lopez on October 25, 2021. Darby Lopez should be excused from work/school.     They should let their workplace manager and staffing office know when their quarantine ends.    We can not give an exact date as it depends on the information below. They can calculate this on their own or work with their manager/staffing office to calculate this. (For example if they were exposed on 10/04, they would have to quarantine for 14 full days. That would be through 10/18. They could return on 10/19.)    Quarantine Guidelines:      If patient receives a positive COVID-19 test result, they should follow the guidance of those who are giving the results. Usually the return to work is 10 (or in some cases 20 days from symptom onset.) If they work at [employer], they must be cleared by Employee Occupational Health and Safety to return to work.        If patient receives a negative COVID-19 test result and did not have a high risk exposure to someone with a known positive COVID-19 test, they can return to work once they're free of fever for 24 hours without fever-reducing medication and their symptoms are improving or resolved.      If patient receives a negative COVID-19 test and if they had a high risk exposure to someone who has tested positive for COVID-19 then they can return to work 14 days after their last contact with the positive individual    Note: If there is ongoing exposure to the covid positive person, this quarantine period may be longer than 14 days. (For example, if they are continually exposed to their child, who tested positive and cannot isolate from them, then the quarantine of 7-14 days can't start until their child is no longer contagious. This is typically 10 days from onset to the child's symptoms. So the total duration may be 17-24 days in this case.)     Sincerely,  Vianca Mtaute PA-C

## 2021-10-26 NOTE — TELEPHONE ENCOUNTER
"-Coronavirus (COVID-19) Notification    Caller Name (Patient, parent, daughter/son, grandparent, etc)  Pt    Reason for call  Notify of Positive Coronavirus (COVID-19) lab results, assess symptoms,  review  [facility] recommendations    Lab Result    Lab test:  2019-nCoV rRt-PCR or SARS-CoV-2 PCR    Oropharyngeal AND/OR nasopharyngeal swabs is POSITIVE for 2019-nCoV RNA/SARS-COV-2 PCR (COVID-19 virus)    RN Recommendations/Instructions per [facility] Coronavirus COVID-19 recommendations    Brief introduction script  Introduce self then review script:  \"I am calling on behalf of [employer].  We were notified that your Coronavirus test (COVID-19) for was POSITIVE for the virus.  I have some information to relay to you but first I wanted to mention that the [place] Dept of Health will be contacting you shortly [it's possible MD already called Patient] to talk to you more about how you are feeling and other people you have had contact with who might now also have the virus.  Also,  [facility] is Partnering with the [facility] for Covid-19 research, you may be contacted directly by research staff.\"    Assessment (Inquire about Patient's current symptoms)   Assessment   Current Symptoms at time of phone call: (if no symptoms, document No symptoms] Loss taste/smell, congested   Symptoms onset (if applicable) 10/24     If at time of call, Patients symptoms hare worsened, the Patient should contact 911 or have someone drive them to Emergency Dept promptly:      If Patient calling 911, inform 911 personal that you have tested positive for the Coronavirus (COVID-19).  Place mask on and await 911 to arrive.    If Emergency Dept, If possible, please have another adult drive you to the Emergency Dept but you need to wear mask when in contact with other people.      Monoclonal Antibody Administration    You may be eligible to receive a new treatment with a monoclonal antibody for preventing " "hospitalization in patients at high risk for complications from COVID-19.   This medication is still experimental and available on a limited basis; it is given through an IV and must be given at an infusion center. Please note that not all people who are eligible will receive the medication since it is in limited supply.     Are you interested in being considered for this medication?  No.   Does the patient fit the criteria: Patient declined    If patient qualifies based on above criteria:  \"You will be contacted if you are selected to receive this treatment in the next 1-2 business days.   This is time sensitive and if you are not selected in the next 1-2 business days, you will not receive the medication.  If you do not receive a call to schedule, you have not been selected.\"      Review information with Patient    Your result was positive. This means you have COVID-19 (coronavirus).  We have sent you a letter that reviews the information that I'll be reviewing with you now.    How can I protect others?    If you have symptoms: stay home and away from others (self-isolate) until:    You've had no fever--and no medicine that reduces fever--for 1 full day (24 hours). And       Your other symptoms have gotten better. For example, your cough or breathing has improved. And     At least 10 days have passed since your symptoms started. (If you've been told by a doctor that you have a weak immune system, wait 20 days.)     If you don't have symptoms: Stay home and away from others (self-isolate) until at least 10 days have passed since your first positive COVID-19 test. (Date test collected)    During this time:    Stay in your own room, including for meals. Use your own bathroom if you can.    Stay away from others in your home. No hugging, kissing or shaking hands. No visitors.     Don't go to work, school or anywhere else.     Clean  high touch  surfaces often (doorknobs, counters, handles, etc.). Use a household " cleaning spray or wipes. You'll find a full list on the EPA website at www.epa.gov/pesticide-registration/list-n-disinfectants-use-against-sars-cov-2.     Cover your mouth and nose with a mask, tissue or other face covering to avoid spreading germs.    Wash your hands and face often with soap and water.    Make a list of people you have been in close contact with recently, even if either of you wore a face covering.   ; Start your list from 2 days before you became ill or had a positive test.  ; Include anyone that was within 6 feet of you for a cumulative total of 15 minutes or more in 24 hours. (Example: if you sat next to Alden for 5 minutes in the morning and 10 minutes in the afternoon, then you were in close contact for 15 minutes total that day. Alden would be added to your list.)    A public health worker will call or text you. It is important that you answer. They will ask you questions about possible exposures to COVID-19, such as people you have been in direct contact with and places you have visited.    Tell the people on your list that you have COVID-19; they should stay away from others for 14 days starting from the last time they were in contact with you (unless you are told something different from a public health worker).     Caregivers in these groups are at risk for severe illness due to COVID-19:  o People 65 years and older  o People who live in a nursing home or long-term care facility  o People with chronic disease (lung, heart, cancer, diabetes, kidney, liver, immunologic)  o People who have a weakened immune system, including those who:  - Are in cancer treatment  - Take medicine that weakens the immune system, such as corticosteroids  - Had a bone marrow or organ transplant  - Have an immune deficiency  - Have poorly controlled HIV or AIDS  - Are obese (body mass index of 40 or higher)  - Smoke regularly    Caregivers should wear gloves while washing dishes, handling laundry and cleaning  bedrooms and bathrooms.    Wash and dry laundry with special caution. Don't shake dirty laundry, and use the warmest water setting you can.    If you have a weakened immune system, ask your doctor about other actions you should take.    For more tips, go to www.cdc.gov/coronavirus/2019-ncov/downloads/10Things.pdf.    You should not go back to work until you meet the guidelines above for ending your home isolation. You don't need to be retested for COVID-19 before going back to work--studies show that you won't spread the virus if it's been at least 10 days since your symptoms started (or 20 days, if you have a weak immune system).    Employers: This document serves as formal notice of your employee's medical guidelines for going back to work. They must meet the above guidelines before going back to work in person.    How can I take care of myself?    1. Get lots of rest. Drink extra fluids (unless a doctor has told you not to).    2. Take Tylenol (acetaminophen) for fever or pain. If you have liver or kidney problems, ask your family doctor if it's okay to take Tylenol.     Take either:     650 mg (two 325 mg pills) every 4 to 6 hours, or     1,000 mg (two 500 mg pills) every 8 hours as needed.     Note: Don't take more than 3,000 mg in one day. Acetaminophen is found in many medicines (both prescribed and over-the-counter medicines). Read all labels to be sure you don't take too much.    For children, check the Tylenol bottle for the right dose (based on their age or weight).    3. If you have other health problems (like cancer, heart failure, an organ transplant or severe kidney disease): Call your specialty clinic if you don't feel better in the next 2 days.    4. Know when to call 911: Emergency warning signs include:    Trouble breathing or shortness of breath    Pain or pressure in the chest that doesn't go away    Feeling confused like you haven't felt before, or not being able to wake up    Bluish-colored  lips or face    5. Sign up for [employer]. We know it's scary to hear that you have COVID-19. We want to track your symptoms to make sure you're okay over the next 2 weeks. Please look for an email from [employer]--this is a free, online program that we'll use to keep in touch. To sign up, follow the link in the email. Learn more at [email or web address].    Where can I get more information?    [facility]: [email or web address]    Coronavirus Basics: www.health[facility].[place]./diseases/coronavirus/basics.html    What to Do If You're Sick: www.cdc.gov/coronavirus/2019-ncov/about/steps-when-sick.html    Ending Home Isolation: www.cdc.gov/coronavirus/2019-ncov/hcp/disposition-in-home-patients.html     Caring for Someone with COVID-19: www.cdc.gov/coronavirus/2019-ncov/if-you-are-sick/care-for-someone.html     [facility] clinical trials (COVID-19 research studies): clinicalaffairs.[facility]-clinical-trials     A Positive COVID-19 letter will be sent via [facility] or the mail. (Exception, no letters sent to Presurgerical/Preprocedure Patients)    Maureen Pacheco

## 2021-10-27 ENCOUNTER — MYC MEDICAL ADVICE (OUTPATIENT)
Dept: FAMILY MEDICINE | Facility: CLINIC | Age: 26
End: 2021-10-27

## 2021-10-28 ENCOUNTER — VIRTUAL VISIT (OUTPATIENT)
Dept: FAMILY MEDICINE | Facility: CLINIC | Age: 26
End: 2021-10-28
Payer: COMMERCIAL

## 2021-10-28 DIAGNOSIS — U07.1 INFECTION DUE TO 2019 NOVEL CORONAVIRUS: Primary | ICD-10-CM

## 2021-10-28 PROCEDURE — 99212 OFFICE O/P EST SF 10 MIN: CPT | Mod: 95 | Performed by: FAMILY MEDICINE

## 2021-10-28 NOTE — PROGRESS NOTES
Darby is a 26 year old who is being evaluated via a billable video visit.      How would you like to obtain your AVS? MyChart  If the video visit is dropped, the invitation should be resent by: Other e-mail: MY CHART   Will anyone else be joining your video visit? No    Video Start Time: 2:26 PM    Assessment & Plan     Darby was seen today for covid concern.    Diagnoses and all orders for this visit:    Infection due to 2019 novel coronavirus    unemployment form printed and completed. Forwarded to staff to be faxed to [facility] [employer] unemployment dept.     Return if symptoms worsen or fail to improve.    Noam Chávez MD  [facility]    Hanna Pastor is a 26 year old who presents for video visit with the following health issues      Follow-up COVID-19 infection - patient reports that she had tested positive for COVID-19 on 10/25/21 after beginning to note symptoms on 10/24/21.  She has been quarantining at home since symptoms began and will need to do so until 11/4/21, presuming that symptoms do not worsen and that she does not epxerience further fevers. She notes that she has not felt feverish for a couple of days at least and symptoms seem to be improving dramatically over the past couple of days.  She denies significant cough, shortness of breath or chest pains.   Patient would like to apply for unemployment benefits during her extended time away from work due to her quarantine and requests documentation for this.       Objective    Vitals - Patient Reported  Temperature (Patient Reported): 97.6  F (36.4  C)    Physical Exam   GENERAL: Healthy, alert and no distress  RESP: No audible wheeze, cough, or visible cyanosis.  No visible retractions or increased work of breathing.    PSYCH: Mentation appears normal, affect normal/bright, judgement and insight intact, normal speech and appearance well-groomed.    Video-Visit Details    Type of service:  Video Visit    Video End  Time:2:32 PM    Originating Location (pt. Location): Home    Distant Location (provider location):  [facility]     Platform used for Video Visit: [place]

## 2021-10-31 NOTE — PATIENT INSTRUCTIONS
Darby,     We faxed over the forms to the unemployment office this afternoon. Please let us know if you run into any difficulties with gaining approval for unemployment benefits, or if you have any worsening symptoms.      Take care,  Noam Chávez MD

## 2022-02-15 ENCOUNTER — VIRTUAL VISIT (OUTPATIENT)
Dept: FAMILY MEDICINE | Facility: CLINIC | Age: 27
End: 2022-02-15
Payer: COMMERCIAL

## 2022-02-15 DIAGNOSIS — F41.9 ANXIETY: Primary | ICD-10-CM

## 2022-02-15 DIAGNOSIS — F33.42 RECURRENT MAJOR DEPRESSIVE DISORDER, IN FULL REMISSION (H): ICD-10-CM

## 2022-02-15 PROCEDURE — 99214 OFFICE O/P EST MOD 30 MIN: CPT | Mod: 95 | Performed by: NURSE PRACTITIONER

## 2022-02-15 RX ORDER — PAROXETINE 40 MG/1
40 TABLET, FILM COATED ORAL DAILY
Qty: 90 TABLET | Refills: 1 | Status: SHIPPED | OUTPATIENT
Start: 2022-02-15 | End: 2022-07-21

## 2022-02-15 ASSESSMENT — PATIENT HEALTH QUESTIONNAIRE - PHQ9
SUM OF ALL RESPONSES TO PHQ QUESTIONS 1-9: 0
10. IF YOU CHECKED OFF ANY PROBLEMS, HOW DIFFICULT HAVE THESE PROBLEMS MADE IT FOR YOU TO DO YOUR WORK, TAKE CARE OF THINGS AT HOME, OR GET ALONG WITH OTHER PEOPLE: NOT DIFFICULT AT ALL
SUM OF ALL RESPONSES TO PHQ QUESTIONS 1-9: 0

## 2022-02-15 ASSESSMENT — ANXIETY QUESTIONNAIRES
1. FEELING NERVOUS, ANXIOUS, OR ON EDGE: SEVERAL DAYS
4. TROUBLE RELAXING: NOT AT ALL
3. WORRYING TOO MUCH ABOUT DIFFERENT THINGS: NOT AT ALL
GAD7 TOTAL SCORE: 2
GAD7 TOTAL SCORE: 2
5. BEING SO RESTLESS THAT IT IS HARD TO SIT STILL: NOT AT ALL
6. BECOMING EASILY ANNOYED OR IRRITABLE: SEVERAL DAYS
GAD7 TOTAL SCORE: 2
7. FEELING AFRAID AS IF SOMETHING AWFUL MIGHT HAPPEN: NOT AT ALL
7. FEELING AFRAID AS IF SOMETHING AWFUL MIGHT HAPPEN: NOT AT ALL
2. NOT BEING ABLE TO STOP OR CONTROL WORRYING: NOT AT ALL

## 2022-02-15 NOTE — PROGRESS NOTES
"Darby is a 26 year old who is being evaluated via a billable video visit.      How would you like to obtain your AVS? MyChart  If the video visit is dropped, the invitation should be resent by: Text to cell phone: [phone]  Will anyone else be joining your video visit? No      Video Start Time: 12:30 PM    Assessment & Plan     Anxiety    - PARoxetine (PAXIL) 40 MG tablet  Dispense: 90 tablet; Refill: 1  She is doing extremely well on her current dose of paroxetine 40 mg daily, her depression is currently in remission and anxiety is mild at this time.  We did review and discuss her screening scores today in regards to her PHQ and CHELA-7.  Did encourage her to get out a bit more to spend time with her friends and get back on track with exercising as she has been doing much better in regards to her emotional state.  No concerns for SI  She is not seeing a therapist at this time, her plans will be to follow-up with me in 6 months for her annual physical and we will repeat her med check again at that time.    Recurrent major depressive disorder, in full remission (H)    - PARoxetine (PAXIL) 40 MG tablet  Dispense: 90 tablet; Refill: 1  She is doing extremely well on her current dose of paroxetine 40 mg daily, her depression is currently in remission and anxiety is mild at this time.  We did review and discuss her screening scores today in regards to her PHQ and CHELA-7.  Did encourage her to get out a bit more to spend time with her friends and get back on track with exercising as she has been doing much better in regards to her emotional state.  No concerns for SI  She is not seeing a therapist at this time, her plans will be to follow-up with me in 6 months for her annual physical and we will repeat her med check again at that time.             BMI:   Estimated body mass index is 33.24 kg/m  as calculated from the following:    Height as of 7/27/21: 1.721 m (5' 7.75\").    Weight as of 7/27/21: 98.4 kg (217 lb). "           Return in about 6 months (around 8/15/2022) for Follow up, Routine preventive, with me, in person, med check anxiety.    Marbella Rodriguez NP  [facility] Pastor is a 26 year old who presents for the following health issues     HPI     Depression and Anxiety Follow-Up    How are you doing with your depression since your last visit? Improved taking Paxil 40 mg daily    How are you doing with your anxiety since your last visit?  Improved     Are you having other symptoms that might be associated with depression or anxiety? Yes:  irritable with menstrual cycles, some lack of motivation that she describes as mild    Have you had a significant life event? No     Do you have any concerns with your use of alcohol or other drugs? No     Therapist: none    Triggers: none noted by patient today    Stress reduction: time with friends, upcoming trip planned to [place] in [place]    No thoughts of self harm or SI    Social History     Tobacco Use     Smoking status: Never Smoker     Smokeless tobacco: Never Used   Substance Use Topics     Alcohol use: No     Drug use: No     PHQ 3/12/2021 7/27/2021 2/15/2022   PHQ-9 Total Score 2 2 0   Q9: Thoughts of better off dead/self-harm past 2 weeks Not at all Not at all Not at all     CHELA-7 SCORE 8/5/2020 3/12/2021 2/15/2022   Total Score - - 2 (minimal anxiety)   Total Score 4 2 2     Last PHQ-9 2/15/2022   1.  Little interest or pleasure in doing things 0   2.  Feeling down, depressed, or hopeless 0   3.  Trouble falling or staying asleep, or sleeping too much 0   4.  Feeling tired or having little energy 0   5.  Poor appetite or overeating 0   6.  Feeling bad about yourself 0   7.  Trouble concentrating 0   8.  Moving slowly or restless 0   Q9: Thoughts of better off dead/self-harm past 2 weeks 0   PHQ-9 Total Score 0   Difficulty at work, home, or with people -     CHELA-7  2/15/2022   1. Feeling nervous, anxious, or on edge 1   2. Not  being able to stop or control worrying 0   3. Worrying too much about different things 0   4. Trouble relaxing 0   5. Being so restless that it is hard to sit still 0   6. Becoming easily annoyed or irritable 1   7. Feeling afraid, as if something awful might happen 0   CHELA-7 Total Score 2   If you checked any problems, how difficult have they made it for you to do your work, take care of things at home, or get along with other people? -       Suicide Assessment Five-step Evaluation and Treatment (SAFE-T)      How many servings of fruits and vegetables do you eat daily?  0-1    On average, how many sweetened beverages do you drink each day (Examples: soda, juice, sweet tea, etc.  Do NOT count diet or artificially sweetened beverages)?   1    How many days per week do you exercise enough to make your heart beat faster? 3 or less    How many minutes a day do you exercise enough to make your heart beat faster? 9 or less    How many days per week do you miss taking your medication? 0        Review of Systems   CONSTITUTIONAL: NEGATIVE for fever, chills, change in weight  ENT/MOUTH: NEGATIVE for ear, mouth and throat problems  RESP: NEGATIVE for significant cough or SOB  CV: NEGATIVE for chest pain, palpitations or peripheral edema      Objective           Vitals:  No vitals were obtained today due to virtual visit.    Physical Exam   GENERAL: Healthy, alert and no distress  EYES: Eyes grossly normal to inspection.  No discharge or erythema, or obvious scleral/conjunctival abnormalities.  RESP: No audible wheeze, cough, or visible cyanosis.  No visible retractions or increased work of breathing.    SKIN: Visible skin clear. No significant rash, abnormal pigmentation or lesions.  NEURO: Cranial nerves grossly intact.  Mentation and speech appropriate for age.  PSYCH: Mentation appears normal, affect normal/bright, judgement and insight intact, normal speech and appearance well-groomed.                Video-Visit  Details    Type of service:  Video Visit    Video End Time:12:36 PM    Originating Location (pt. Location): Home    Distant Location (provider location):  [facility]     Platform used for Video Visit: [employer]  Answers for HPI/ROS submitted by the patient on 2/15/2022  If you checked off any problems, how difficult have these problems made it for you to do your work, take care of things at home, or get along with other people?: Not difficult at all  PHQ9 TOTAL SCORE: 0  CHELA 7 TOTAL SCORE: 2

## 2022-02-16 ASSESSMENT — ANXIETY QUESTIONNAIRES: GAD7 TOTAL SCORE: 2

## 2022-02-16 ASSESSMENT — PATIENT HEALTH QUESTIONNAIRE - PHQ9: SUM OF ALL RESPONSES TO PHQ QUESTIONS 1-9: 0

## 2022-03-01 ENCOUNTER — HOSPITAL ENCOUNTER (EMERGENCY)
Facility: CLINIC | Age: 27
Discharge: HOME | End: 2022-03-01
Admitting: PHYSICIAN ASSISTANT
Payer: COMMERCIAL

## 2022-03-01 ENCOUNTER — APPOINTMENT (OUTPATIENT)
Dept: ULTRASOUND IMAGING | Facility: CLINIC | Age: 27
End: 2022-03-01
Payer: COMMERCIAL

## 2022-03-01 VITALS
OXYGEN SATURATION: 98 % | TEMPERATURE: 98 F | SYSTOLIC BLOOD PRESSURE: 147 MMHG | HEART RATE: 121 BPM | WEIGHT: 195 LBS | DIASTOLIC BLOOD PRESSURE: 73 MMHG | BODY MASS INDEX: 29.87 KG/M2

## 2022-03-01 DIAGNOSIS — M79.89 LEG SWELLING: ICD-10-CM

## 2022-03-01 LAB — D DIMER PPP FEU-MCNC: 0.52 UG/ML FEU (ref 0–0.5)

## 2022-03-01 PROCEDURE — 93971 EXTREMITY STUDY: CPT | Mod: LT

## 2022-03-01 PROCEDURE — 36415 COLL VENOUS BLD VENIPUNCTURE: CPT | Performed by: PHYSICIAN ASSISTANT

## 2022-03-01 PROCEDURE — 99284 EMERGENCY DEPT VISIT MOD MDM: CPT | Mod: 25

## 2022-03-01 PROCEDURE — 85379 FIBRIN DEGRADATION QUANT: CPT | Performed by: PHYSICIAN ASSISTANT

## 2022-03-01 NOTE — ED PROVIDER NOTES
EMERGENCY DEPARTMENT ENCOUNTER      NAME: Darby Lopez  AGE: 26 year old female  YOB: 1995  MRN: [id number]  EVALUATION DATE & TIME: 3/1/2022  2:30 PM    PCP: Marbella Rodriguez    ED PROVIDER: Rasheed Soto PA-C      Chief Complaint   Patient presents with     Leg Pain         FINAL IMPRESSION:  1. Leg swelling          MEDICAL DECISION MAKING:    Pertinent Labs & Imaging studies reviewed. (See chart for details)  26 year old female presents to the Emergency Department for evaluation of left lower leg swelling and mild calf tenderness.    Patient first noticed symptoms about 3 to 4 days ago.  Because of the persistent symptoms and swelling she presented for assessment.  Plan to work symptoms up with a left lower extremity ultrasound as well as D-dimer.  She denies any acute chest pain or shortness of breath.  No previous history of blood clots.    I did discuss the negative ultrasound results with the patient as well as the slightly elevated D-dimer.  Because of this elevation in the D-dimer I did recommend outpatient repeat ultrasound 1 week from today.  This can occur through the primary care.  If she develops new or worsening symptoms such as chest pain or shortness of breath she should return the ED immediately.  Overall patient comfortable with plan of care.    ED COURSE    I met with the patient, obtained history, performed an initial exam, and discussed options and plan for diagnostics and treatment here in the ED.    At the conclusion of the encounter I discussed the results of all of the tests and the disposition. The questions were answered. The patient or family acknowledged understanding and was agreeable with the care plan.     MEDICATIONS GIVEN IN THE EMERGENCY:  Medications - No data to display    NEW PRESCRIPTIONS STARTED AT TODAY'S ER VISIT  New Prescriptions    No medications on file            =================================================================    HPI    Patient  information was obtained from: Patient  Darby Lopez is a 26 year old female who presents to this ED for evaluation of left lower extremity swelling as well as intermittent calf tenderness.  Patient reports that Friday evening she describes working on some type of project where she was drawing, to stabilize her legs she said that she had her left leg flexed against something fairly rigid.  Later that evening in her sleep she noticed some left calf pain.  Over the progressing days she has noticed some swelling that has been coming and going and the pain is also been coming and going.  Today she had her legs wrapped while she was on her feet at work and she noticed the swelling persisted.  She reports that her mother was recently diagnosed with a blood clot they likely believe secondary to cancer and because of this had concerned therefore presented to be assessed.  Patient currently denies any chest pain or shortness of breath and there is no previous history of a blood clot for herself.  She is a non-smoker and does not use any type of birth control.      REVIEW OF SYSTEMS   Review of Systems   Musculoskeletal:        Left leg swelling   All other systems reviewed and are negative.         PAST MEDICAL HISTORY:  Past Medical History:   Diagnosis Date     Agoraphobia      Anxiety      Dermatitis      Panic state        PAST SURGICAL HISTORY:  Past Surgical History:   Procedure Laterality Date     OTHER SURGICAL HISTORY      None         CURRENT MEDICATIONS:    No current facility-administered medications for this encounter.    Current Outpatient Medications:      cholecalciferol (VITAMIN D3) 125 mcg (5000 units) capsule, Take by mouth daily, Disp: , Rfl:      fluocinonide (LIDEX) 0.05 % external solution, [FLUOCINONIDE (LIDEX) 0.05 % EXTERNAL SOLUTION] Apply topically 2 (two) times a day., Disp: 60 mL, Rfl: 0     PARoxetine (PAXIL) 40 MG tablet, Take 1 tablet (40 mg) by mouth daily, Disp: 90 tablet, Rfl: 1      triamcinolone (KENALOG) 0.1 % external cream, Apply topically 3 times daily, Disp: 80 g, Rfl: 0      ALLERGIES:  Allergies   Allergen Reactions     Zoloft [Sertraline] Diarrhea       FAMILY HISTORY:  Family History   Problem Relation Age of Onset     Skin Cancer Mother      Diabetes Father      Hyperlipidemia Father      No Known Problems Brother      Lung Cancer Maternal Grandmother      Diabetes Maternal Grandfather      Lymphoma Paternal Grandmother      Diabetes Paternal Grandfather      Anxiety Disorder Maternal Aunt        SOCIAL HISTORY:   Social History     Socioeconomic History     Marital status: Single     Spouse name: Not on file     Number of children: 0     Years of education: 14     Highest education level: Not on file   Occupational History     Not on file   Tobacco Use     Smoking status: Never Smoker     Smokeless tobacco: Never Used   Substance and Sexual Activity     Alcohol use: No     Drug use: No     Sexual activity: Never   Other Topics Concern     Not on file   Social History Narrative    Lives with parents, was home schooled all her life up until age 19     Social Determinants of Health     Financial Resource Strain: Not on file   Food Insecurity: Not on file   Transportation Needs: Not on file   Physical Activity: Not on file   Stress: Not on file   Social Connections: Not on file   Intimate Partner Violence: Not on file   Housing Stability: Not on file       VITALS:  Patient Vitals for the past 24 hrs:   BP Temp Temp src Pulse SpO2 Weight   03/01/22 1433 (!) 147/73 98  F (36.7  C) Temporal (!) 121 98 % 88.5 kg (195 lb)       PHYSICAL EXAM    Physical Exam  Vitals and nursing note reviewed.   Constitutional:       General: She is not in acute distress.     Appearance: She is obese. She is not ill-appearing or toxic-appearing.   HENT:      Head: Normocephalic.      Right Ear: External ear normal.      Left Ear: External ear normal.   Eyes:      Conjunctiva/sclera: Conjunctivae normal.    Cardiovascular:      Rate and Rhythm: Tachycardia present.      Pulses: Normal pulses.      Comments: Patient does seem somewhat anxious  Pulmonary:      Effort: Pulmonary effort is normal. No respiratory distress.   Musculoskeletal:         General: No swelling, deformity or signs of injury.      Left lower leg: Edema present.      Comments: Mild reproducible tenderness posterior calf   Skin:     General: Skin is warm and dry.      Findings: No bruising.   Neurological:      General: No focal deficit present.      Mental Status: She is alert.      Sensory: No sensory deficit.      Motor: No weakness.   Psychiatric:      Comments: Mild anxiety during examination          LAB:  All pertinent labs reviewed and interpreted.  Results for orders placed or performed during the hospital encounter of 03/01/22   US Lower Extremity Venous Duplex Left    Impression    IMPRESSION:  1.  No deep venous thrombosis in the left lower extremity.   D dimer quantitative   Result Value Ref Range    D-Dimer Quantitative 0.52 (H) 0.00 - 0.50 ug/mL FEU       RADIOLOGY:  Reviewed all pertinent imaging. Please see official radiology report.  US Lower Extremity Venous Duplex Left   Final Result   IMPRESSION:   1.  No deep venous thrombosis in the left lower extremity.            Rasheed Soto PA-C  Emergency Medicine  [facility]     Rasheed Soto PA-C  03/01/22 [id number]

## 2022-03-01 NOTE — DISCHARGE INSTRUCTIONS
RecommendYou may use Tylenol and ibuprofen as needed for ongoing discomfort, you can also focus on elevation and continue using Ace wraps as needed.  You have repeat ultrasound 1 week from today since the D-dimer is slightly elevated.  Certainly if you develop new or worsening symptoms such as sudden onset of chest pain or shortness of breath I would encourage you to return to the ED.

## 2022-03-01 NOTE — ED TRIAGE NOTES
Patient is here with left lower leg pain and swelling that was noticed today. Hx of family blood clots in the family.

## 2022-07-19 ASSESSMENT — ENCOUNTER SYMPTOMS
HEADACHES: 0
DIZZINESS: 0
PALPITATIONS: 0
EYE PAIN: 0
PARESTHESIAS: 0
COUGH: 0
BREAST MASS: 0
HEMATURIA: 0
HEMATOCHEZIA: 0
ARTHRALGIAS: 0
HEARTBURN: 0
DIARRHEA: 0
DYSURIA: 0
FREQUENCY: 0
CONSTIPATION: 0
MYALGIAS: 0
CHILLS: 0
SORE THROAT: 0
ABDOMINAL PAIN: 0
FEVER: 0
JOINT SWELLING: 0
SHORTNESS OF BREATH: 0
WEAKNESS: 0
NERVOUS/ANXIOUS: 0
NAUSEA: 0

## 2022-07-19 ASSESSMENT — ANXIETY QUESTIONNAIRES
GAD7 TOTAL SCORE: 2
7. FEELING AFRAID AS IF SOMETHING AWFUL MIGHT HAPPEN: NOT AT ALL
GAD7 TOTAL SCORE: 2
6. BECOMING EASILY ANNOYED OR IRRITABLE: SEVERAL DAYS
3. WORRYING TOO MUCH ABOUT DIFFERENT THINGS: NOT AT ALL
7. FEELING AFRAID AS IF SOMETHING AWFUL MIGHT HAPPEN: NOT AT ALL
5. BEING SO RESTLESS THAT IT IS HARD TO SIT STILL: NOT AT ALL
8. IF YOU CHECKED OFF ANY PROBLEMS, HOW DIFFICULT HAVE THESE MADE IT FOR YOU TO DO YOUR WORK, TAKE CARE OF THINGS AT HOME, OR GET ALONG WITH OTHER PEOPLE?: SOMEWHAT DIFFICULT
GAD7 TOTAL SCORE: 2
4. TROUBLE RELAXING: NOT AT ALL
2. NOT BEING ABLE TO STOP OR CONTROL WORRYING: NOT AT ALL
1. FEELING NERVOUS, ANXIOUS, OR ON EDGE: SEVERAL DAYS

## 2022-07-19 ASSESSMENT — PATIENT HEALTH QUESTIONNAIRE - PHQ9
SUM OF ALL RESPONSES TO PHQ QUESTIONS 1-9: 1
10. IF YOU CHECKED OFF ANY PROBLEMS, HOW DIFFICULT HAVE THESE PROBLEMS MADE IT FOR YOU TO DO YOUR WORK, TAKE CARE OF THINGS AT HOME, OR GET ALONG WITH OTHER PEOPLE: NOT DIFFICULT AT ALL
SUM OF ALL RESPONSES TO PHQ QUESTIONS 1-9: 1

## 2022-07-21 ENCOUNTER — OFFICE VISIT (OUTPATIENT)
Dept: FAMILY MEDICINE | Facility: CLINIC | Age: 27
End: 2022-07-21
Payer: COMMERCIAL

## 2022-07-21 VITALS
HEART RATE: 88 BPM | TEMPERATURE: 98.4 F | RESPIRATION RATE: 16 BRPM | SYSTOLIC BLOOD PRESSURE: 112 MMHG | WEIGHT: 213.7 LBS | OXYGEN SATURATION: 98 % | BODY MASS INDEX: 33.54 KG/M2 | DIASTOLIC BLOOD PRESSURE: 64 MMHG | HEIGHT: 67 IN

## 2022-07-21 DIAGNOSIS — F33.41 RECURRENT MAJOR DEPRESSIVE DISORDER, IN PARTIAL REMISSION (H): ICD-10-CM

## 2022-07-21 DIAGNOSIS — L68.9 EXCESSIVE HAIR GROWTH: ICD-10-CM
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
## 2022-07-21 DIAGNOSIS — Z00.00 ANNUAL PHYSICAL EXAM: Primary | ICD-10-CM

## 2022-07-21 DIAGNOSIS — F41.9 ANXIETY: ICD-10-CM

## 2022-07-21 DIAGNOSIS — S00.06XA TICK BITE OF SCALP, INITIAL ENCOUNTER: ICD-10-CM

## 2022-07-21 LAB
ANION GAP SERPL CALCULATED.3IONS-SCNC: 12 MMOL/L (ref 7–15)
BUN SERPL-MCNC: 9.3 MG/DL (ref 6–20)
CALCIUM SERPL-MCNC: 9.7 MG/DL (ref 8.6–10)
CHLORIDE SERPL-SCNC: 99 MMOL/L (ref 98–107)
CHOLEST SERPL-MCNC: 277 MG/DL
CREAT SERPL-MCNC: 0.61 MG/DL (ref 0.51–0.95)
DEPRECATED HCO3 PLAS-SCNC: 24 MMOL/L (ref 22–29)
GFR SERPL CREATININE-BSD FRML MDRD: >90 ML/MIN/1.73M2
GLUCOSE SERPL-MCNC: 196 MG/DL (ref 70–99)
HBA1C MFR BLD: 8.6 % (ref 0–5.6)
HDLC SERPL-MCNC: 40 MG/DL
HGB BLD-MCNC: 13.9 G/DL (ref 11.7–15.7)
LDLC SERPL CALC-MCNC: 171 MG/DL
NONHDLC SERPL-MCNC: 237 MG/DL
POTASSIUM SERPL-SCNC: 4.7 MMOL/L (ref 3.4–5.3)
SODIUM SERPL-SCNC: 135 MMOL/L (ref 136–145)
TRIGL SERPL-MCNC: 332 MG/DL
TSH SERPL DL<=0.005 MIU/L-ACNC: 3.52 UIU/ML (ref 0.3–4.2)

## 2022-07-21 PROCEDURE — 83001 ASSAY OF GONADOTROPIN (FSH): CPT | Performed by: NURSE PRACTITIONER

## 2022-07-21 PROCEDURE — 85018 HEMOGLOBIN: CPT | Performed by: NURSE PRACTITIONER

## 2022-07-21 PROCEDURE — 36415 COLL VENOUS BLD VENIPUNCTURE: CPT | Performed by: NURSE PRACTITIONER

## 2022-07-21 PROCEDURE — 82533 TOTAL CORTISOL: CPT | Performed by: NURSE PRACTITIONER

## 2022-07-21 PROCEDURE — 82627 DEHYDROEPIANDROSTERONE: CPT | Performed by: NURSE PRACTITIONER

## 2022-07-21 PROCEDURE — 99395 PREV VISIT EST AGE 18-39: CPT | Performed by: NURSE PRACTITIONER

## 2022-07-21 PROCEDURE — 84146 ASSAY OF PROLACTIN: CPT | Performed by: NURSE PRACTITIONER

## 2022-07-21 PROCEDURE — 82306 VITAMIN D 25 HYDROXY: CPT | Performed by: NURSE PRACTITIONER

## 2022-07-21 PROCEDURE — 83002 ASSAY OF GONADOTROPIN (LH): CPT | Performed by: NURSE PRACTITIONER

## 2022-07-21 PROCEDURE — 96127 BRIEF EMOTIONAL/BEHAV ASSMT: CPT | Performed by: NURSE PRACTITIONER

## 2022-07-21 PROCEDURE — 83036 HEMOGLOBIN GLYCOSYLATED A1C: CPT | Performed by: NURSE PRACTITIONER

## 2022-07-21 PROCEDURE — 99214 OFFICE O/P EST MOD 30 MIN: CPT | Mod: 25 | Performed by: NURSE PRACTITIONER

## 2022-07-21 PROCEDURE — 84403 ASSAY OF TOTAL TESTOSTERONE: CPT | Performed by: NURSE PRACTITIONER

## 2022-07-21 PROCEDURE — 84270 ASSAY OF SEX HORMONE GLOBUL: CPT | Performed by: NURSE PRACTITIONER

## 2022-07-21 PROCEDURE — 84443 ASSAY THYROID STIM HORMONE: CPT | Performed by: NURSE PRACTITIONER

## 2022-07-21 PROCEDURE — 80061 LIPID PANEL: CPT | Performed by: NURSE PRACTITIONER

## 2022-07-21 PROCEDURE — 80048 BASIC METABOLIC PNL TOTAL CA: CPT | Performed by: NURSE PRACTITIONER

## 2022-07-21 RX ORDER — PAROXETINE 40 MG/1
40 TABLET, FILM COATED ORAL DAILY
Qty: 90 TABLET | Refills: 1 | Status: SHIPPED | OUTPATIENT
Start: 2022-07-21

## 2022-07-21 RX ORDER — CLOBETASOL PROPIONATE 0.5 MG/G
CREAM TOPICAL 2 TIMES DAILY
COMMUNITY

## 2022-07-21 ASSESSMENT — ENCOUNTER SYMPTOMS
DIARRHEA: 0
SORE THROAT: 0
JOINT SWELLING: 0
DIZZINESS: 0
FEVER: 0
FREQUENCY: 0
WEAKNESS: 0
HEMATOCHEZIA: 0
HEMATURIA: 0
HEARTBURN: 0
SHORTNESS OF BREATH: 0
EYE PAIN: 0
MYALGIAS: 0
DYSURIA: 0
ARTHRALGIAS: 0
CONSTIPATION: 0
CHILLS: 0
NAUSEA: 0
PALPITATIONS: 0
HEADACHES: 0
PARESTHESIAS: 0
NERVOUS/ANXIOUS: 0
BREAST MASS: 0
COUGH: 0
ABDOMINAL PAIN: 0

## 2022-07-21 ASSESSMENT — PAIN SCALES - GENERAL: PAINLEVEL: NO PAIN (0)

## 2022-07-21 NOTE — PROGRESS NOTES
SUBJECTIVE:   CC: Darby Lopez is an 27 year old woman who presents for preventive health visit,  Med check, tick bite, excessive hair growth. LMP: 7/18/22, lasts about 5 days. Periods are regular but she does have issues with excessive hair growth on the chin and face, abdomen and low back. She is inquiring about PCOS but has never been tested for it. Due for COVID vaccine.       Patient has been advised of split billing requirements and indicates understanding: Yes  Healthy Habits:     Getting at least 3 servings of Calcium per day:  Yes    Bi-annual eye exam:  Yes    Dental care twice a year:  NO    Sleep apnea or symptoms of sleep apnea:  None    Diet:  Regular (no restrictions)    Frequency of exercise:  2-3 days/week    Duration of exercise:  15-30 minutes    Taking medications regularly:  Yes    Medication side effects:  None    PHQ-2 Total Score: 0    Additional concerns today:  Yes      Tick bite: 2 days ago. Location: back of scalp left lower. Removed fully intact. Has tenderness around the bite site otherwise no systemic symptoms. She did look up the type of tick and was Dog[place] tick. 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       Depression and Anxiety Follow-Up    How are you doing with your depression since your last visit? Improved     How are you doing with your anxiety since your last visit?  Improved     Are you having other symptoms that might be associated with depression or anxiety? Yes:  flares with periods    Have you had a significant life event? No     Do you have any concerns with your use of alcohol or other drugs? No     Rx: Paxil 40 mg daily    Therapist: none    Stress reduction: get outside, sun exposure, make more time for hobbies, be around dog    Thoughts of self harm: none        Social History     Tobacco Use     Smoking status: Never Smoker     Smokeless tobacco: Never Used   Substance Use Topics     Alcohol use: No     Drug use: No     PHQ 7/27/2021  2/15/2022 7/19/2022   PHQ-9 Total Score 2 0 1   Q9: Thoughts of better off dead/self-harm past 2 weeks Not at all Not at all Not at all     CHELA-7 SCORE 3/12/2021 2/15/2022 7/19/2022   Total Score - 2 (minimal anxiety) 2 (minimal anxiety)   Total Score 2 2 2     [id number]}    Today's PHQ-2 Score:   PHQ-2 ( 1999 Pfizer) 7/19/2022   Q1: Little interest or pleasure in doing things 0   Q2: Feeling down, depressed or hopeless 0   PHQ-2 Score 0   PHQ-2 Total Score (12-17 Years)- Positive if 3 or more points; Administer PHQ-A if positive -   Q1: Little interest or pleasure in doing things Not at all   Q2: Feeling down, depressed or hopeless Not at all   PHQ-2 Score 0       Abuse: Current or Past (Physical, Sexual or Emotional) - No  Do you feel safe in your environment? Yes        Social History     Tobacco Use     Smoking status: Never Smoker     Smokeless tobacco: Never Used   Substance Use Topics     Alcohol use: No     If you drink alcohol do you typically have >3 drinks per day or >7 drinks per week? Not applicable    Alcohol Use 7/19/2022   Prescreen: >3 drinks/day or >7 drinks/week? Not Applicable   Prescreen: >3 drinks/day or >7 drinks/week? -       Reviewed orders with patient.  Reviewed health maintenance and updated orders accordingly - Yes  Lab work is in process  Labs reviewed in EPIC  BP Readings from Last 3 Encounters:   07/21/22 112/64   03/01/22 (!) 147/73   07/27/21 116/80    Wt Readings from Last 3 Encounters:   07/21/22 96.9 kg (213 lb 11.2 oz)   03/01/22 88.5 kg (195 lb)   07/27/21 98.4 kg (217 lb)                    Breast Cancer Screening:    FHS-7:   Breast CA Risk Assessment (FHS-7) 7/19/2022   Did any of your first-degree relatives have breast or ovarian cancer? Yes   Did any of your relatives have bilateral breast cancer? No   Did any man in your family have breast cancer? No   Did any woman in your family have breast and ovarian cancer? Unknown   Did any woman in your family have breast cancer before  age 50 y? Yes   Do you have 2 or more relatives with breast and/or ovarian cancer? Unknown   Do you have 2 or more relatives with breast and/or bowel cancer? Unknown       Patient under 40 years of age: Routine Mammogram Screening not recommended.   Pertinent mammograms are reviewed under the imaging tab.    History of abnormal Pap smear: NO - age 21-29 PAP every 3 years recommended  PAP / HPV Latest Ref Rng & Units 1/28/2020   PAP Negative for squamous intraepithelial lesion or malignancy. Negative for squamous intraepithelial lesion or malignancy  Electronically signed by Betsy Louise CT (ASCP) on 1/29/2020 at  3:34 PM       Reviewed and updated as needed this visit by clinical staff   Tobacco  Allergies  Meds                Reviewed and updated as needed this visit by Provider                   Past Medical History:   Diagnosis Date     Agoraphobia      Anxiety      Dermatitis      Panic state         Review of Systems   Constitutional: Negative for chills and fever.   HENT: Negative for congestion, ear pain, hearing loss and sore throat.    Eyes: Negative for pain and visual disturbance.   Respiratory: Negative for cough and shortness of breath.    Cardiovascular: Negative for chest pain, palpitations and peripheral edema.   Gastrointestinal: Negative for abdominal pain, constipation, diarrhea, heartburn, hematochezia and nausea.   Breasts:  Negative for tenderness, breast mass and discharge.   Genitourinary: Negative for dysuria, frequency, genital sores, hematuria, pelvic pain, urgency, vaginal bleeding and vaginal discharge.   Musculoskeletal: Negative for arthralgias, joint swelling and myalgias.   Skin: Negative for rash.   Neurological: Negative for dizziness, weakness, headaches and paresthesias.   Psychiatric/Behavioral: Negative for mood changes. The patient is not nervous/anxious.           OBJECTIVE:   /64 (BP Location: Right arm, Patient Position: Sitting, Cuff Size: Adult Regular)   " Pulse 88   Temp 98.4  F (36.9  C) (Oral)   Resp 16   Ht 1.702 m (5' 7\")   Wt 96.9 kg (213 lb 11.2 oz)   LMP 07/18/2022   SpO2 98%   Breastfeeding No   BMI 33.47 kg/m    Physical Exam  GENERAL: healthy, alert and no distress  HEAD: left lower scalp with small area of mild redness and swelling from tick bite  EYES: Eyes grossly normal to inspection, PERRL and conjunctivae and sclerae normal  HENT: ear canals and TM's normal, nose and mouth without ulcers or lesions  NECK: no adenopathy, no asymmetry, masses, or scars and thyroid normal to palpation  RESP: lungs clear to auscultation - no rales, rhonchi or wheezes  BREAST: normal without masses, tenderness or nipple discharge and no palpable axillary masses or adenopathy  CV: regular rate and rhythm, normal S1 S2, no S3 or S4, no murmur, click or rub, no peripheral edema and peripheral pulses strong  ABDOMEN: soft, nontender, no hepatosplenomegaly, no masses and bowel sounds normal   (female): normal female external genitalia, normal urethral meatus, vaginal mucosa pink, moist, pelvic exam is unremarkable, non tender  RECTAL: normal sphincter tone, no rectal masses  MS: no gross musculoskeletal defects noted, no edema  SKIN: rash present on bilateral axillary region, area is moderately erythematous and flat.  Excessive hair growth noted on the upper lip, lower back and abdomen.  Hair is thick and black  NEURO: Normal strength and tone, mentation intact and speech normal  PSYCH: mentation appears normal, affect normal/bright  LYMPH: no cervical, supraclavicular, axillary, or inguinal adenopathy    Diagnostic Test Results:  Labs reviewed in Epic    ASSESSMENT/PLAN:   (Z00.00) Annual physical exam  (primary encounter diagnosis)  Comment:   Plan: REVIEW OF HEALTH MAINTENANCE PROTOCOL ORDERS,         BASIC METABOLIC PANEL, Hemoglobin, Lipid         Profile, Vitamin D Deficiency            (F41.9) Anxiety  Comment:   Plan: PARoxetine (PAXIL) 40 MG tablet        " Anxiety and depression is in partial remission at this time, she will continue with her current regimen.  We did review and discuss her PHQ and yobany screening scores today.  She reports no side effects from the medication and she is compliant with use.  No concerns for suicidal ideation.  She will follow-up with me virtually using a telephone visit in 6 months    (F33.41) Recurrent major depressive disorder, in partial remission (H)  Comment:   Plan: PARoxetine (PAXIL) 40 MG tablet              Anxiety and depression is in partial remission at this time, she will continue with her current regimen.  We did review and discuss her PHQ and yobany screening scores today.  She reports no side effects from the medication and she is compliant with use.  No concerns for suicidal ideation.  She will follow-up with me virtually using a telephone visit in 6 months      (S00.06XA,  W57.XXXA) Tick bite of scalp, initial encounter  Comment:   Plan: CANCELED: Lyme Disease Total Abs Bld with         Reflex to Confirm CLIA  Tick bite is under 48 hours old.  Based on the appearance of the tick, it does appear to have been a [place] tick so she we will monitor for any systemic symptoms.  If she develops any systemic symptoms, she will return to lab for Lyme disease testing and [place] fever testing.            (L68.9) Excessive hair growth  Comment:   Plan: Cortisol, DHEA sulfate, Follicle stimulating         hormone, Hemoglobin A1c, Luteinizing Hormone,         Adult, Prolactin, Testosterone Free and Total,         TSH        Lab work ordered to assess for PCOS, menstrual cycles are regular but based on the severity of her excessive hair growth, will rule this out today.  Labs are pending      Patient has been advised of split billing requirements and indicates understanding: Yes    COUNSELING:  Reviewed preventive health counseling, as reflected in patient instructions       Regular exercise       Healthy diet/nutrition       Vision  "screening       Alcohol Use       Contraception       Safe sex practices/STD prevention    Estimated body mass index is 33.47 kg/m  as calculated from the following:    Height as of this encounter: 1.702 m (5' 7\").    Weight as of this encounter: 96.9 kg (213 lb 11.2 oz).    Weight management plan: Discussed healthy diet and exercise guidelines    She reports that she has never smoked. She has never used smokeless tobacco.      Counseling Resources:  ATP IV Guidelines  Pooled Cohorts Equation Calculator  Breast Cancer Risk Calculator  BRCA-Related Cancer Risk Assessment: FHS-7 Tool  FRAX Risk Assessment  ICSI Preventive Guidelines  Dietary Guidelines for Americans, 2010  [employer]'s MyPlate  ASA Prophylaxis  Lung CA Screening    Marbella Rodriguez NP  [facility]  Answers for HPI/ROS submitted by the patient on 7/19/2022  If you checked off any problems, how difficult have these problems made it for you to do your work, take care of things at home, or get along with other people?: Not difficult at all  PHQ9 TOTAL SCORE: 1  CHELA 7 TOTAL SCORE: 2      "

## 2022-07-22 LAB
CORTIS SERPL-MCNC: 9.3 UG/DL
DEPRECATED CALCIDIOL+CALCIFEROL SERPL-MC: 38 UG/L (ref 20–75)
DHEA-S SERPL-MCNC: 278 UG/DL (ref 35–430)
FSH SERPL IRP2-ACNC: 7.3 MIU/ML
LH SERPL-ACNC: 6.3 MIU/ML
PROLACTIN SERPL 3RD IS-MCNC: 16 NG/ML (ref 5–23)
SHBG SERPL-SCNC: 6 NMOL/L (ref 30–135)

## 2022-07-24 LAB
TESTOST FREE SERPL-MCNC: 0.65 NG/DL
TESTOST SERPL-MCNC: 19 NG/DL (ref 8–60)

## 2022-07-25 DIAGNOSIS — E11.9 TYPE 2 DIABETES, HBA1C GOAL < 7% (H): Primary | ICD-10-CM

## 2022-09-25 ENCOUNTER — HEALTH MAINTENANCE LETTER (OUTPATIENT)
Age: 27
End: 2022-09-25

## 2022-10-31 PROBLEM — E11.9 DIABETES MELLITUS, TYPE 2 (H): Status: ACTIVE | Noted: 2022-10-31

## 2023-01-30 ENCOUNTER — HEALTH MAINTENANCE LETTER (OUTPATIENT)
Age: 28
End: 2023-01-30

## 2023-05-13 ENCOUNTER — HEALTH MAINTENANCE LETTER (OUTPATIENT)
Age: 28
End: 2023-05-13

## 2023-10-14 ENCOUNTER — HEALTH MAINTENANCE LETTER (OUTPATIENT)
Age: 28
End: 2023-10-14

## 2024-03-02 ENCOUNTER — HEALTH MAINTENANCE LETTER (OUTPATIENT)
Age: 29
End: 2024-03-02

## 2024-07-20 ENCOUNTER — HEALTH MAINTENANCE LETTER (OUTPATIENT)
Age: 29
End: 2024-07-20

## 2024-12-07 ENCOUNTER — HEALTH MAINTENANCE LETTER (OUTPATIENT)
Age: 29
End: 2024-12-07

## 2025-03-15 ENCOUNTER — HEALTH MAINTENANCE LETTER (OUTPATIENT)
Age: 30
End: 2025-03-15

## 2025-06-28 ENCOUNTER — HEALTH MAINTENANCE LETTER (OUTPATIENT)
Age: 30
End: 2025-06-28